# Patient Record
Sex: FEMALE | Race: WHITE | NOT HISPANIC OR LATINO | Employment: FULL TIME | URBAN - METROPOLITAN AREA
[De-identification: names, ages, dates, MRNs, and addresses within clinical notes are randomized per-mention and may not be internally consistent; named-entity substitution may affect disease eponyms.]

---

## 2017-01-02 ENCOUNTER — TELEPHONE (OUTPATIENT)
Dept: URGENT CARE | Facility: URGENT CARE | Age: 42
End: 2017-01-02

## 2017-01-02 DIAGNOSIS — M54.9 BACK PAIN, UNSPECIFIED BACK LOCATION, UNSPECIFIED BACK PAIN LATERALITY, UNSPECIFIED CHRONICITY: Primary | ICD-10-CM

## 2017-01-02 RX ORDER — HYDROCODONE BITARTRATE AND ACETAMINOPHEN 5; 325 MG/1; MG/1
1 TABLET ORAL EVERY 6 HOURS PRN
Qty: 12 TABLET | Refills: 0 | Status: SHIPPED | OUTPATIENT
Start: 2017-01-02 | End: 2017-01-05

## 2017-04-13 ENCOUNTER — HOSPITAL ENCOUNTER (OUTPATIENT)
Dept: MAMMOGRAPHY | Facility: CLINIC | Age: 42
Discharge: HOME | End: 2017-04-13
Attending: OBSTETRICS & GYNECOLOGY | Admitting: OBSTETRICS & GYNECOLOGY
Payer: MEDICAID

## 2017-04-13 DIAGNOSIS — Z12.31 VISIT FOR SCREENING MAMMOGRAM: ICD-10-CM

## 2017-04-13 PROCEDURE — G0202 SCR MAMMO BI INCL CAD: HCPCS

## 2017-08-10 ENCOUNTER — OFFICE VISIT (OUTPATIENT)
Dept: URGENT CARE | Facility: URGENT CARE | Age: 42
End: 2017-08-10
Payer: COMMERCIAL

## 2017-08-10 VITALS
SYSTOLIC BLOOD PRESSURE: 120 MMHG | WEIGHT: 194.5 LBS | DIASTOLIC BLOOD PRESSURE: 70 MMHG | OXYGEN SATURATION: 98 % | TEMPERATURE: 98 F | BODY MASS INDEX: 30.01 KG/M2 | HEART RATE: 88 BPM

## 2017-08-10 DIAGNOSIS — M54.41 ACUTE BILATERAL LOW BACK PAIN WITH RIGHT-SIDED SCIATICA: Primary | ICD-10-CM

## 2017-08-10 PROCEDURE — 99214 OFFICE O/P EST MOD 30 MIN: CPT | Performed by: PHYSICIAN ASSISTANT

## 2017-08-10 RX ORDER — ACETAMINOPHEN AND CODEINE PHOSPHATE 300; 30 MG/1; MG/1
1-2 TABLET ORAL EVERY 4 HOURS PRN
Qty: 15 TABLET | Refills: 0 | Status: SHIPPED | OUTPATIENT
Start: 2017-08-10 | End: 2017-10-01

## 2017-08-10 RX ORDER — PREDNISONE 20 MG/1
TABLET ORAL
Qty: 18 TABLET | Refills: 0 | Status: SHIPPED | OUTPATIENT
Start: 2017-08-10 | End: 2017-10-01

## 2017-08-10 RX ORDER — CYCLOBENZAPRINE HCL 5 MG
5 TABLET ORAL 3 TIMES DAILY PRN
Qty: 30 TABLET | Refills: 0 | Status: SHIPPED | OUTPATIENT
Start: 2017-08-10 | End: 2018-10-17

## 2017-08-10 NOTE — PROGRESS NOTES
May is a 42 year old female who presents with bilateral low back pain since this morning, radiates down right leg to her mid calf.    States that she cleaned her carpet yesterday, felt fine afterwards, but pain started in back upon awakening today.  She states that this has happened about 10 times, maybe annually when she cleans her carpets.        There is no history of injury.    The pain is located on both sides.  The back pain radiates into the right leg.  Measures which improve the pain include nothing.    Had to leave work early today    Measures which worsen the pain include movement and sitting.      There is no history of abdominal pain or change in urination.    Past Medical History:   Diagnosis Date     Allergic rhinitis, cause unspecified      Back pain     last 16 years     Bipolar I disorder, most recent episode (or current) unspecified      Diabetes in preg-unspec      Hyperlipidemia LDL goal <160 10/31/2010     Hysteroscopic sterilization by ESSURE--3/30/10 3/30/2010     Lumbago 2/1/2010     Ovarian cyst 2/3/2009     Ovarian cyst      Pain in joint, pelvic region and thigh 2/1/2010     Tobacco use disorder        Social History   Substance Use Topics     Smoking status: Current Every Day Smoker     Packs/day: 0.50     Years: 15.00     Types: Cigarettes     Smokeless tobacco: Never Used      Comment: 1/2 PPD     Alcohol use Yes      Comment: 10 per month       EXAM: The patient today appears uncomfortable.  /70 (BP Location: Left arm, Patient Position: Chair, Cuff Size: Adult Regular)  Pulse 88  Temp 98  F (36.7  C) (Oral)  Wt 194 lb 8 oz (88.2 kg)  SpO2 98%  BMI 30.01 kg/m2  Back symmetric, no curvature. ROM normal. No CVA tenderness., positive findings: tenderness to palpation in paraspinous muscles bilaterally.  Positive straight leg test on right  EXT: Lower extremities have excellent strength bilaterally  NEURO:  Sensation to light touch intact bilaterally  DTRs normal and  symmetric throughout    (M54.41) Acute bilateral low back pain with right-sided sciatica  (primary encounter diagnosis)  Comment:   Plan: predniSONE (DELTASONE) 20 MG tablet,         cyclobenzaprine (FLEXERIL) 5 MG tablet,         acetaminophen-codeine (TYLENOL/CODEINE #3)         300-30 MG per tablet          May try ice to area for 20 minutes 3 times a day (over thin cloth)  Advise follow up with Orthopedics    Suggest avoiding carpet cleaning in future as it seems to consistently aggravate your back    Patient expresses understanding and agreement with the assessment and plan as above.

## 2017-08-10 NOTE — MR AVS SNAPSHOT
"              After Visit Summary   8/10/2017    May Burleson    MRN: [id number]           Patient Information     Date Of Birth          1975        Visit Information        Provider Department      8/10/2017 2:05 PM Bianca Mendiola PA-C [facility]        Today's Diagnoses     Acute bilateral low back pain with right-sided sciatica    -  1      Care Instructions    (M54.41) Acute bilateral low back pain with right-sided sciatica  (primary encounter diagnosis)  Comment:   Plan: predniSONE (DELTASONE) 20 MG tablet,         cyclobenzaprine (FLEXERIL) 5 MG tablet,         acetaminophen-codeine (TYLENOL/CODEINE #3)         300-30 MG per tablet          May try ice to area for 20 minutes 3 times a day (over thin cloth)  Advise follow up with Orthopedics    Suggest avoiding carpet cleaning in future as it seems to consistently aggravate your back          Follow-ups after your visit        Who to contact     If you have questions or need follow up information about today's clinic visit or your schedule please contact [facility] directly at [phone].  Normal or non-critical lab and imaging results will be communicated to you by Anturishart, letter or phone within 4 business days after the clinic has received the results. If you do not hear from us within 7 days, please contact the clinic through Samplesaintt or phone. If you have a critical or abnormal lab result, we will notify you by phone as soon as possible.  Submit refill requests through Zubie or call your pharmacy and they will forward the refill request to us. Please allow 3 business days for your refill to be completed.          Additional Information About Your Visit        Anturishart Information     Zubie lets you send messages to your doctor, view your test results, renew your prescriptions, schedule appointments and more. To sign up, go to [email or web address] . Click on \"Log in\" " "on the left side of the screen, which will take you to the Welcome page. Then click on \"Sign up Now\" on the right side of the page.     You will be asked to enter the access code listed below, as well as some personal information. Please follow the directions to create your username and password.     Your access code is: [email or web address]  Expires: 2017  2:59 PM     Your access code will  in 90 days. If you need help or a new code, please call your [facility] or [phone].        Care EveryWhere ID     This is your Care EveryWhere ID. This could be used by other organizations to access your [facility] medical records  [id number]        Your Vitals Were     Pulse Temperature Pulse Oximetry BMI (Body Mass Index)          88 98  F (36.7  C) (Oral) 98% 30.01 kg/m2         Blood Pressure from Last 3 Encounters:   08/10/17 120/70   16 130/83   10/05/16 126/78    Weight from Last 3 Encounters:   08/10/17 194 lb 8 oz (88.2 kg)   16 191 lb (86.6 kg)   10/05/16 192 lb 14.4 oz (87.5 kg)              Today, you had the following     No orders found for display         Today's Medication Changes          These changes are accurate as of: 8/10/17  2:59 PM.  If you have any questions, ask your nurse or doctor.               Start taking these medicines.        Dose/Directions    acetaminophen-codeine 300-30 MG per tablet   Commonly known as:  TYLENOL/codeine #3   Used for:  Acute bilateral low back pain with right-sided sciatica   Started by:  Bianca Mendiola PA-C        Dose:  1-2 tablet   Take 1-2 tablets by mouth every 4 hours as needed for mild pain   Quantity:  15 tablet   Refills:  0       cyclobenzaprine 5 MG tablet   Commonly known as:  FLEXERIL   Used for:  Acute bilateral low back pain with right-sided sciatica   Started by:  Bianac Mendiola PA-C        Dose:  5 mg   Take 1 tablet (5 mg) by mouth 3 times daily as needed   Quantity:  30 tablet   Refills:  0         These " medicines have changed or have updated prescriptions.        Dose/Directions    * predniSONE 20 MG tablet   Commonly known as:  DELTASONE   This may have changed:  Another medication with the same name was added. Make sure you understand how and when to take each.   Used for:  Cervical radiculopathy   Changed by:  Augustin Pan MD        Take 3 tabs (60 mg) orally daily for 3 days, 2 tabs (40 mg) orally daily for 3 days, 1 tab (20 mg) orally daily for 3 days, then 1/2 tab (10 mg) orally for 3 days   Quantity:  20 tablet   Refills:  0       * predniSONE 20 MG tablet   Commonly known as:  DELTASONE   This may have changed:  You were already taking a medication with the same name, and this prescription was added. Make sure you understand how and when to take each.   Used for:  Acute bilateral low back pain with right-sided sciatica   Changed by:  Bianca Mendiola PA-C        3 po QD for 3 days, then 2 po QD for 3 days, then 1 po QD for 3 days   Quantity:  18 tablet   Refills:  0       * Notice:  This list has 2 medication(s) that are the same as other medications prescribed for you. Read the directions carefully, and ask your doctor or other care provider to review them with you.         Where to get your medicines      These medications were sent to [facility] [address] [address]     Phone:  [phone]     cyclobenzaprine 5 MG tablet    predniSONE 20 MG tablet         Some of these will need a paper prescription and others can be bought over the counter.  Ask your nurse if you have questions.     Bring a paper prescription for each of these medications     acetaminophen-codeine 300-30 MG per tablet                Primary Care Provider Office Phone # Fax #    Spike Pederson [phone]       [address] [address]        Equal Access to Services     CRYSTAL SALGADO AH: Patricio Wright  waangelinada chenadaha, qaybta kaalleila peterson, nita mccartneyaan ah. So [facility] [phone].    ATENCIÓN: Si joshua lucia, tiene a jones disposición servicios gratuitos de asistencia lingüística. Adri al [phone].    We comply with applicable federal civil rights laws and [place] laws. We do not discriminate on the basis of race, color, national origin, age, disability sex, sexual orientation or gender identity.            Thank you!     Thank you for choosing [facility]  for your care. Our goal is always to provide you with excellent care. Hearing back from our patients is one way we can continue to improve our services. Please take a few minutes to complete the written survey that you may receive in the mail after your visit with us. Thank you!             Your Updated Medication List - Protect others around you: Learn how to safely use, store and throw away your medicines at www.disposemymeds.org.          This list is accurate as of: 8/10/17  2:59 PM.  Always use your most recent med list.                   Brand Name Dispense Instructions for use Diagnosis    acetaminophen-codeine 300-30 MG per tablet    TYLENOL/codeine #3    15 tablet    Take 1-2 tablets by mouth every 4 hours as needed for mild pain    Acute bilateral low back pain with right-sided sciatica       cyclobenzaprine 5 MG tablet    FLEXERIL    30 tablet    Take 1 tablet (5 mg) by mouth 3 times daily as needed    Acute bilateral low back pain with right-sided sciatica       naproxen 500 MG tablet    NAPROSYN    60 tablet    Take 1 tablet (500 mg) by mouth 2 times daily (with meals)    Right foot pain       order for DME     1 Device    Equipment being ordered: rigid post op shoe    Right foot pain       * predniSONE 20 MG tablet    DELTASONE    20 tablet    Take 3 tabs (60 mg) orally daily for 3 days, 2 tabs (40 mg) orally daily for 3 days, 1 tab (20 mg) orally daily for 3 days, then 1/2 tab (10 mg)  orally for 3 days    Cervical radiculopathy       * predniSONE 20 MG tablet    DELTASONE    18 tablet    3 po QD for 3 days, then 2 po QD for 3 days, then 1 po QD for 3 days    Acute bilateral low back pain with right-sided sciatica       traMADol 50 MG tablet    ULTRAM    20 tablet    Take 1-2 tablets ( mg) by mouth every 6 hours as needed for pain maximum  tablet(s) per day    Cervical radiculopathy       valACYclovir 1000 mg tablet    VALTREX    21 tablet    Take 1 tablet (1,000 mg) by mouth 3 times daily for 7 days    Herpes zoster without complication       venlafaxine 75 MG 24 hr capsule    EFFEXOR-XR    90 capsule    Take 1 capsule (75 mg) by mouth daily    Anxiety       * Notice:  This list has 2 medication(s) that are the same as other medications prescribed for you. Read the directions carefully, and ask your doctor or other care provider to review them with you.

## 2017-08-10 NOTE — PATIENT INSTRUCTIONS
(M54.41) Acute bilateral low back pain with right-sided sciatica  (primary encounter diagnosis)  Comment:   Plan: predniSONE (DELTASONE) 20 MG tablet,         cyclobenzaprine (FLEXERIL) 5 MG tablet,         acetaminophen-codeine (TYLENOL/CODEINE #3)         300-30 MG per tablet          May try ice to area for 20 minutes 3 times a day (over thin cloth)  Advise follow up with Orthopedics    Suggest avoiding carpet cleaning in future as it seems to consistently aggravate your back

## 2017-08-10 NOTE — NURSING NOTE
"Chief Complaint   Patient presents with     Back Pain     Lower back pain which radiated to leg x today        Initial /70 (BP Location: Left arm, Patient Position: Chair, Cuff Size: Adult Regular)  Pulse 88  Temp 98  F (36.7  C) (Oral)  Wt 194 lb 8 oz (88.2 kg)  SpO2 98%  BMI 30.01 kg/m2 Estimated body mass index is 30.01 kg/(m^2) as calculated from the following:    Height as of 8/19/16: 5' 7.5\" (1.715 m).    Weight as of this encounter: 194 lb 8 oz (88.2 kg).  Medication Reconciliation: complete    "

## 2017-10-01 ENCOUNTER — OFFICE VISIT (OUTPATIENT)
Dept: URGENT CARE | Facility: URGENT CARE | Age: 42
End: 2017-10-01
Payer: COMMERCIAL

## 2017-10-01 VITALS
BODY MASS INDEX: 29.78 KG/M2 | SYSTOLIC BLOOD PRESSURE: 100 MMHG | HEART RATE: 83 BPM | OXYGEN SATURATION: 96 % | WEIGHT: 193 LBS | DIASTOLIC BLOOD PRESSURE: 70 MMHG | TEMPERATURE: 98.6 F

## 2017-10-01 DIAGNOSIS — J20.9 ACUTE BRONCHITIS WITH SYMPTOMS > 10 DAYS: Primary | ICD-10-CM

## 2017-10-01 PROCEDURE — 99213 OFFICE O/P EST LOW 20 MIN: CPT | Performed by: FAMILY MEDICINE

## 2017-10-01 RX ORDER — ALBUTEROL SULFATE 90 UG/1
1-2 AEROSOL, METERED RESPIRATORY (INHALATION) EVERY 4 HOURS PRN
Qty: 1 INHALER | Refills: 0 | Status: SHIPPED | OUTPATIENT
Start: 2017-10-01 | End: 2018-10-22

## 2017-10-01 RX ORDER — AZITHROMYCIN 250 MG/1
TABLET, FILM COATED ORAL
Qty: 6 TABLET | Refills: 0 | Status: SHIPPED | OUTPATIENT
Start: 2017-10-01 | End: 2018-10-17

## 2017-10-01 NOTE — PATIENT INSTRUCTIONS

## 2017-10-01 NOTE — PROGRESS NOTES
SUBJECTIVE:  Chief Complaint   Patient presents with     URI     Pt c/o URI sxs X 3 weeks. Today, pt has a fever of 101 R/O pneumonia.     Urgent Care     May Burleson is a 42 year old female who presents to the clinic today with a chief complaint of cough  and shortness of breath. for 3 week(s).  Patient denies central chest pain.   Her cough is described as persistent, daytime, nightime, productive of yellow sputum and wheezing.    The patient's symptoms are moderate and worsening.  Associated symptoms include fever, nasal congestion and malaise. The patient's symptoms are exacerbated by exercise  Patient has been using OTC cough suppressants  to improve symptoms.    Past Medical History:   Diagnosis Date     Allergic rhinitis, cause unspecified      Back pain     last 16 years     Bipolar I disorder, most recent episode (or current) unspecified      Diabetes mellitus of mother, complicating pregnancy, childbirth, or the puerperium, unspecified as to episode of care(648.00)      Hyperlipidemia LDL goal <160 10/31/2010     Hysteroscopic sterilization by ESS[facility]--3/30/10 3/30/2010     Lumbago 2/1/2010     Ovarian cyst 2/3/2009     Ovarian cyst      Pain in joint, pelvic region and thigh 2/1/2010     Tobacco use disorder        ALLERGIES:  Albuterol; Penicillins; and Pollen extract      Current Outpatient Prescriptions on File Prior to Visit:  cyclobenzaprine (FLEXERIL) 5 MG tablet Take 1 tablet (5 mg) by mouth 3 times daily as needed     No current facility-administered medications on file prior to visit.     Social History   Substance Use Topics     Smoking status: Current Every Day Smoker     Packs/day: 0.50     Years: 15.00     Types: Cigarettes     Smokeless tobacco: Never Used      Comment: 1/2 PPD     Alcohol use Yes      Comment: 10 per month       Family History   Problem Relation Age of Onset     DIABETES Paternal Grandmother      Depression Mother      DIABETES Mother      Hypertension Mother       DIABETES Paternal Grandfather      CANCER Maternal Grandmother      colon     Thyroid Disease Daughter      asthma     Allergies Daughter      Allergies Son      C.A.D. Maternal Grandfather      MI in his 50s     C.A.D. Paternal Grandfather      mi in his 50s     Breast Cancer Paternal Grandmother          ROS  INTEGUMENTARY/SKIN: NEGATIVE for worrisome rashes, moles or lesions  EYES: NEGATIVE for vision changes or irritation  GI: NEGATIVE for nausea, abdominal pain, heartburn, or change in bowel habits    OBJECTIVE:  /70  Pulse 83  Temp 98.6  F (37  C)  Wt 193 lb (87.5 kg)  SpO2 96%  BMI 29.78 kg/m2  GENERAL APPEARANCE: alert, mild distress and cooperative  EYES: EOMI,  PERRL, conjunctiva clear  HENT: ear canals and TM's normal.  Nose and mouth without ulcers, erythema or lesions  NECK: supple, nontender, no lymphadenopathy  RESP: rhonchi bilateral, little  CV: regular rates and rhythm, normal S1 S2, no murmur noted  ABDOMEN:  soft, nontender, no HSM or masses and bowel sounds normal  NEURO: Normal strength and tone, sensory exam grossly normal,  normal speech and mentation  SKIN: no suspicious lesions or rashes       ASSESSMENT:    Acute bronchitis with symptoms > 10 days     - azithromycin (ZITHROMAX) 250 MG tablet; 2 tablets day 1 then 1 tablet daily for 4 days  - albuterol (PROAIR HFA/PROVENTIL HFA/VENTOLIN HFA) 108 (90 BASE) MCG/ACT Inhaler; Inhale 1-2 puffs into the lungs every 4 hours as needed for shortness of breath / dyspnea or wheezing       Symptomatic measures encouraged, humidified air, plenty of fluids.  Patient may consider OTC expectorant and/or cough suppressant to treat symptoms.  Return if worsening

## 2017-10-01 NOTE — NURSING NOTE
"Chief Complaint   Patient presents with     URI     Pt c/o URI sxs X 3 weeks. Today, pt has a fever of 101 R/O pneumonia.     Urgent Care       Initial /70  Pulse 83  Temp 98.6  F (37  C)  Wt 193 lb (87.5 kg)  SpO2 96%  BMI 29.78 kg/m2 Estimated body mass index is 29.78 kg/(m^2) as calculated from the following:    Height as of 8/19/16: 5' 7.5\" (1.715 m).    Weight as of this encounter: 193 lb (87.5 kg).  Medication Reconciliation: complete    "

## 2018-02-09 NOTE — MR AVS SNAPSHOT
After Visit Summary   10/1/2017    May Burleson    MRN: [id number]           Patient Information     Date Of Birth          1975        Visit Information        Provider Department      10/1/2017 12:20 PM Reena Victor MD [facility]        Today's Diagnoses     Acute bronchitis with symptoms > 10 days    -  1      Care Instructions      Acute Bronchitis  Your healthcare provider has told you that you have acute bronchitis. Bronchitis is infection or inflammation of the bronchial tubes (airways in the lungs). Normally, air moves easily in and out of the airways. Bronchitis narrows the airways, making it harder for air to flow in and out of the lungs. This causes symptoms such as shortness of breath, coughing up yellow or green mucus, and wheezing. Bronchitis can be acute or chronic. Acute means the condition comes on quickly and goes away in a short time, usually within 3 to 10 days. Chronic means a condition lasts a long time and often comes back.    What causes acute bronchitis?  Acute bronchitis almost always starts as a viral respiratory infection, such as a cold or the flu. Certain factors make it more likely for a cold or flu to turn into bronchitis. These include being very young, being elderly, having a heart or lung problem, or having a weak immune system. Cigarette smoking also makes bronchitis more likely.  When bronchitis develops, the airways become swollen. The airways may also become infected with bacteria. This is known as a secondary infection.  Diagnosing acute bronchitis  Your healthcare provider will examine you and ask about your symptoms and health history. You may also have a sputum culture to test the fluid in your lungs. Chest X-rays may be done to look for infection in the lungs.  Treating acute bronchitis  Bronchitis usually clears up as the cold or flu goes away. You can help feel better faster by doing the following:    Take  medicine as directed. You may be told to take ibuprofen or other over-the-counter medicines. These help relieve inflammation in your bronchial tubes. Your healthcare provider may prescribe an inhaler to help open up the bronchial tubes. Most of the time, acute bronchitis is caused by a viral infection. Antibiotics are usually not prescribed for viral infections.    Drink plenty of fluids, such as water, juice, or warm soup. Fluids loosen mucus so that you can cough it up. This helps you breathe more easily. Fluids also prevent dehydration.    Make sure you get plenty of rest.    Do not smoke. Do not allow anyone else to smoke in your home.  Recovery and follow-up  Follow up with your doctor as you are told. You will likely feel better in a week or two. But a dry cough can linger beyond that time. Let your doctor know if you still have symptoms (other than a dry cough) after 2 weeks, or if you re prone to getting bronchial infections. Take steps to protect yourself from future infections. These steps include stopping smoking and avoiding tobacco smoke, washing your hands often, and getting a yearly flu shot.  When to call your healthcare provider  Call the healthcare provider if you have any of the following:    Fever of 100.4 F (38.0 C) or higher, or as advised    Symptoms that get worse, or new symptoms    Trouble breathing    Symptoms that don t start to improve within a week, or within 3 days of taking antibiotics   Date Last Reviewed: 12/1/2016 2000-2017 [employer]. [address] [place] [address], [place] [address]. All rights reserved. This information is not intended as a substitute for professional medical care. Always follow your healthcare professional's instructions.                Follow-ups after your visit        Who to contact     If you have questions or need follow up information about today's clinic visit or your schedule please contact [facility] directly at  "[phone].  Normal or non-critical lab and imaging results will be communicated to you by MyChart, letter or phone within 4 business days after the clinic has received the results. If you do not hear from us within 7 days, please contact the clinic through Pannahart or phone. If you have a critical or abnormal lab result, we will notify you by phone as soon as possible.  Submit refill requests through stylefruits or call your pharmacy and they will forward the refill request to us. Please allow 3 business days for your refill to be completed.          Additional Information About Your Visit        Pannahart Information     stylefruits lets you send messages to your doctor, view your test results, renew your prescriptions, schedule appointments and more. To sign up, go to [email or web address] . Click on \"Log in\" on the left side of the screen, which will take you to the Welcome page. Then click on \"Sign up Now\" on the right side of the page.     You will be asked to enter the access code listed below, as well as some personal information. Please follow the directions to create your username and password.     Your access code is: [email or web address]  Expires: 2017  2:59 PM     Your access code will  in 90 days. If you need help or a new code, please call your [place] clinic or [phone].        Care EveryWhere ID     This is your Care EveryWhere ID. This could be used by other organizations to access your [place] medical records  [id number]        Your Vitals Were     Pulse Temperature Pulse Oximetry BMI (Body Mass Index)          83 98.6  F (37  C) 96% 29.78 kg/m2         Blood Pressure from Last 3 Encounters:   10/01/17 100/70   08/10/17 120/70   16 130/83    Weight from Last 3 Encounters:   10/01/17 193 lb (87.5 kg)   08/10/17 194 lb 8 oz (88.2 kg)   16 191 lb (86.6 kg)              Today, you had the following     No orders found for display         Today's Medication Changes          These " changes are accurate as of: 10/1/17  1:34 PM.  If you have any questions, ask your nurse or doctor.               Start taking these medicines.        Dose/Directions    albuterol 108 (90 BASE) MCG/ACT Inhaler   Commonly known as:  PROAIR HFA/PROVENTIL HFA/VENTOLIN HFA   Used for:  Acute bronchitis with symptoms > 10 days   Started by:  Reena Victor MD        Dose:  1-2 puff   Inhale 1-2 puffs into the lungs every 4 hours as needed for shortness of breath / dyspnea or wheezing   Quantity:  1 Inhaler   Refills:  0       azithromycin 250 MG tablet   Commonly known as:  ZITHROMAX   Used for:  Acute bronchitis with symptoms > 10 days   Started by:  Reena Victor MD        2 tablets day 1 then 1 tablet daily for 4 days   Quantity:  6 tablet   Refills:  0            Where to get your medicines      These medications were sent to [facility] [address] [place] [address] [address] AT [place] [address] [address] [facility] [address]    Hours:  24-hours Phone:  [phone]     albuterol 108 (90 BASE) MCG/ACT Inhaler    azithromycin 250 MG tablet                Primary Care Provider Office Phone # Fax #    Spike Pederson [phone]       [address] [address]        Equal Access to Services     CRYSTAL SALGADO AH: Hadii aad ku hadasho Soomaali, waaxda luqadaha, qaybta kaalmada adejermaineyada, nita alves . So [facility] [phone].    ATENCIÓN: Si habla español, tiene a jones disposición servicios gratuitos de asistencia lingüística. Adri al [phone].    We comply with applicable federal civil rights laws and [place] laws. We do not discriminate on the basis of race, color, national origin, age, disability, sex, sexual orientation, or gender identity.            Thank you!     Thank you for choosing [facility]  for your care. Our goal is always to provide you with excellent care. Hearing back from  our patients is one way we can continue to improve our services. Please take a few minutes to complete the written survey that you may receive in the mail after your visit with us. Thank you!             Your Updated Medication List - Protect others around you: Learn how to safely use, store and throw away your medicines at www.disposemymeds.org.          This list is accurate as of: 10/1/17  1:34 PM.  Always use your most recent med list.                   Brand Name Dispense Instructions for use Diagnosis    albuterol 108 (90 BASE) MCG/ACT Inhaler    PROAIR HFA/PROVENTIL HFA/VENTOLIN HFA    1 Inhaler    Inhale 1-2 puffs into the lungs every 4 hours as needed for shortness of breath / dyspnea or wheezing    Acute bronchitis with symptoms > 10 days       azithromycin 250 MG tablet    ZITHROMAX    6 tablet    2 tablets day 1 then 1 tablet daily for 4 days    Acute bronchitis with symptoms > 10 days       cyclobenzaprine 5 MG tablet    FLEXERIL    30 tablet    Take 1 tablet (5 mg) by mouth 3 times daily as needed    Acute bilateral low back pain with right-sided sciatica          4

## 2018-05-14 ENCOUNTER — OFFICE VISIT (OUTPATIENT)
Dept: INTERNAL MEDICINE | Facility: CLINIC | Age: 43
End: 2018-05-14
Payer: COMMERCIAL

## 2018-05-14 VITALS
HEART RATE: 80 BPM | RESPIRATION RATE: 16 BRPM | HEIGHT: 68 IN | SYSTOLIC BLOOD PRESSURE: 110 MMHG | TEMPERATURE: 98.3 F | DIASTOLIC BLOOD PRESSURE: 80 MMHG | WEIGHT: 187.3 LBS | BODY MASS INDEX: 28.39 KG/M2

## 2018-05-14 DIAGNOSIS — J31.0 CHRONIC RHINITIS, UNSPECIFIED TYPE: ICD-10-CM

## 2018-05-14 DIAGNOSIS — B96.89 BACTERIAL SINUSITIS: Primary | ICD-10-CM

## 2018-05-14 DIAGNOSIS — J32.9 BACTERIAL SINUSITIS: Primary | ICD-10-CM

## 2018-05-14 PROCEDURE — 99213 OFFICE O/P EST LOW 20 MIN: CPT | Performed by: PHYSICIAN ASSISTANT

## 2018-05-14 RX ORDER — DOXYCYCLINE 100 MG/1
100 CAPSULE ORAL 2 TIMES DAILY
Qty: 14 CAPSULE | Refills: 0 | Status: SHIPPED | OUTPATIENT
Start: 2018-05-14 | End: 2018-10-17

## 2018-05-14 RX ORDER — CETIRIZINE HYDROCHLORIDE 10 MG/1
10 TABLET ORAL EVERY EVENING
Qty: 30 TABLET | Refills: 11 | Status: SHIPPED | OUTPATIENT
Start: 2018-05-14 | End: 2019-06-14

## 2018-05-14 RX ORDER — FLUTICASONE PROPIONATE 50 MCG
1-2 SPRAY, SUSPENSION (ML) NASAL DAILY
Qty: 1 BOTTLE | Refills: 11 | Status: SHIPPED | OUTPATIENT
Start: 2018-05-14 | End: 2020-09-18

## 2018-05-14 ASSESSMENT — PAIN SCALES - GENERAL: PAINLEVEL: MODERATE PAIN (4)

## 2018-05-14 NOTE — MR AVS SNAPSHOT
"              After Visit Summary   2018    May Arrieta    MRN: [id number]           Patient Information     Date Of Birth          1975        Visit Information        Provider Department      2018 2:40 PM Minda Millan PA-C [facility]        Today's Diagnoses     Bacterial sinusitis    -  1    Chronic rhinitis, unspecified type          Care Instructions    Follow up if symptoms worsen or fail to improve           Follow-ups after your visit        Who to contact     If you have questions or need follow up information about today's clinic visit or your schedule please contact [facility] directly at [phone].  Normal or non-critical lab and imaging results will be communicated to you by MyChart, letter or phone within 4 business days after the clinic has received the results. If you do not hear from us within 7 days, please contact the clinic through MyChart or phone. If you have a critical or abnormal lab result, we will notify you by phone as soon as possible.  Submit refill requests through Exakis or call your pharmacy and they will forward the refill request to us. Please allow 3 business days for your refill to be completed.          Additional Information About Your Visit        MyChart Information     Exakis lets you send messages to your doctor, view your test results, renew your prescriptions, schedule appointments and more. To sign up, go to [email or web address] . Click on \"Log in\" on the left side of the screen, which will take you to the Welcome page. Then click on \"Sign up Now\" on the right side of the page.     You will be asked to enter the access code listed below, as well as some personal information. Please follow the directions to create your username and password.     Your access code is: [email or web address]  Expires: 2018  3:12 PM     Your access code will  in 90 days. If you need help or a new code, " "please call your [place] clinic or [phone].        Care EveryWhere ID     This is your Care EveryWhere ID. This could be used by other organizations to access your [place] medical records  [id number]        Your Vitals Were     Pulse Temperature Respirations Height BMI (Body Mass Index)       80 98.3  F (36.8  C) (Oral) 16 5' 7.5\" (1.715 m) 28.9 kg/m2        Blood Pressure from Last 3 Encounters:   05/14/18 110/80   10/01/17 100/70   08/10/17 120/70    Weight from Last 3 Encounters:   05/14/18 187 lb 4.8 oz (85 kg)   10/01/17 193 lb (87.5 kg)   08/10/17 194 lb 8 oz (88.2 kg)              Today, you had the following     No orders found for display         Today's Medication Changes          These changes are accurate as of 5/14/18  3:12 PM.  If you have any questions, ask your nurse or doctor.               Start taking these medicines.        Dose/Directions    cetirizine 10 MG tablet   Commonly known as:  zyrTEC   Used for:  Chronic rhinitis, unspecified type   Started by:  Minda Millan PA-C        Dose:  10 mg   Take 1 tablet (10 mg) by mouth every evening   Quantity:  30 tablet   Refills:  11       doxycycline 100 MG capsule   Commonly known as:  VIBRAMYCIN   Used for:  Bacterial sinusitis   Started by:  Minda Millan PA-C        Dose:  100 mg   Take 1 capsule (100 mg) by mouth 2 times daily   Quantity:  14 capsule   Refills:  0       fluticasone 50 MCG/ACT spray   Commonly known as:  FLONASE   Used for:  Chronic rhinitis, unspecified type   Started by:  Minda Millan PA-C        Dose:  1-2 spray   Spray 1-2 sprays into both nostrils daily   Quantity:  1 Bottle   Refills:  11            Where to get your medicines      These medications were sent to [facility] [address] [address]     Phone:  [phone]     cetirizine 10 MG tablet    doxycycline 100 MG capsule    fluticasone 50 MCG/ACT spray                Primary " Care Provider Office Phone # Fax #    Spike Pederson [phone]       [address] [facility] [address]        Equal Access to Services     CRYSTAL SALGADO : Hadii hillary ku williamso Sokaitlynali, waaxda luqadaha, qaybta kaalmada adejessi, nita nolankimberly papo. So [facility] [phone].    ATENCIÓN: Si habla español, tiene a jones disposición servicios gratuitos de asistencia lingüística. Llame al [phone].    We comply with applicable federal civil rights laws and [place] laws. We do not discriminate on the basis of race, color, national origin, age, disability, sex, sexual orientation, or gender identity.            Thank you!     Thank you for choosing [facility]  for your care. Our goal is always to provide you with excellent care. Hearing back from our patients is one way we can continue to improve our services. Please take a few minutes to complete the written survey that you may receive in the mail after your visit with us. Thank you!             Your Updated Medication List - Protect others around you: Learn how to safely use, store and throw away your medicines at www.disposemymeds.org.          This list is accurate as of 5/14/18  3:12 PM.  Always use your most recent med list.                   Brand Name Dispense Instructions for use Diagnosis    albuterol 108 (90 Base) MCG/ACT Inhaler    PROAIR HFA/PROVENTIL HFA/VENTOLIN HFA    1 Inhaler    Inhale 1-2 puffs into the lungs every 4 hours as needed for shortness of breath / dyspnea or wheezing    Acute bronchitis with symptoms > 10 days       azithromycin 250 MG tablet    ZITHROMAX    6 tablet    2 tablets day 1 then 1 tablet daily for 4 days    Acute bronchitis with symptoms > 10 days       cetirizine 10 MG tablet    zyrTEC    30 tablet    Take 1 tablet (10 mg) by mouth every evening    Chronic rhinitis, unspecified type       cyclobenzaprine 5 MG tablet    FLEXERIL    30 tablet    Take 1 tablet  (5 mg) by mouth 3 times daily as needed    Acute bilateral low back pain with right-sided sciatica       doxycycline 100 MG capsule    VIBRAMYCIN    14 capsule    Take 1 capsule (100 mg) by mouth 2 times daily    Bacterial sinusitis       fluticasone 50 MCG/ACT spray    FLONASE    1 Bottle    Spray 1-2 sprays into both nostrils daily    Chronic rhinitis, unspecified type

## 2018-05-14 NOTE — PROGRESS NOTES
"  SUBJECTIVE:   May Arrieta is a 42 year old female who presents to clinic today for the following health issues:      Acute Illness   Acute illness concerns: Sinus Pressure/Pain   Onset: x2 wks     Fever: no     Chills/Sweats: no     Headache (location?): YES    Sinus Pressure:YES    Conjunctivitis:  YES: both watery     Ear Pain: no    Rhinorrhea: no     Congestion: YES    Sore Throat: YES- slightly in the morning feels itchy and puffy      Cough: YES, dry     Wheeze: no     Decreased Appetite: YES- slightly     Nausea: YES- slightly     Vomiting: no     Diarrhea:  no     Dysuria/Freq.: no     Fatigue/Achiness: YES- fatigue     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  Additional history: as documented      Reviewed and updated as needed this visit by clinical staff  Tobacco  Allergies  Meds  Problems       Reviewed and updated as needed this visit by Provider  Meds  Problems           OBJECTIVE:     /80 (BP Location: Right arm, Patient Position: Chair, Cuff Size: Adult Regular)  Pulse 80  Temp 98.3  F (36.8  C) (Oral)  Resp 16  Ht 5' 7.5\" (1.715 m)  Wt 187 lb 4.8 oz (85 kg)  BMI 28.9 kg/m2  Body mass index is 28.9 kg/(m^2).  GENERAL: healthy, alert and no distress  EYES: Eyes grossly normal to inspection, PERRL and conjunctivae and sclerae normal  HENT: normal cephalic/atraumatic, ear canals and TM's normal, nasal mucosa edematous , oropharynx clear, oral mucous membranes moist and sinuses: maxillary, frontal tenderness on bilaterally  NECK: bilateral anterior cervical adenopathy  RESP: lungs clear to auscultation - no rales, rhonchi or wheezes  CV: regular rates and rhythm, normal S1 S2, no S3 or S4 and no murmur, click or rub    Diagnostic Test Results:  none     ASSESSMENT/PLAN:         1. Bacterial sinusitis  - sinus pain > 10 " days with treat esumptively for bacterial infection  - doxycycline chosen based on penicillin allergy she   - doxycycline (VIBRAMYCIN) 100 MG capsule; Take 1 capsule (100 mg) by mouth 2 times daily  Dispense: 14 capsule; Refill: 0    2. Chronic rhinitis, unspecified type  - chronic underlying allergies, reviewed daily regimen as below   - cetirizine (ZYRTEC) 10 MG tablet; Take 1 tablet (10 mg) by mouth every evening  Dispense: 30 tablet; Refill: 11  - fluticasone (FLONASE) 50 MCG/ACT spray; Spray 1-2 sprays into both nostrils daily  Dispense: 1 Bottle; Refill: 11    Pt to follow-up if symptoms worsen or fail to improve.    Minda Millan PA-C  [facility]

## 2018-09-09 ENCOUNTER — OFFICE VISIT (OUTPATIENT)
Dept: URGENT CARE | Facility: URGENT CARE | Age: 43
End: 2018-09-09
Payer: COMMERCIAL

## 2018-09-09 VITALS
TEMPERATURE: 98.5 F | RESPIRATION RATE: 20 BRPM | DIASTOLIC BLOOD PRESSURE: 80 MMHG | WEIGHT: 187 LBS | SYSTOLIC BLOOD PRESSURE: 140 MMHG | HEART RATE: 80 BPM | BODY MASS INDEX: 28.86 KG/M2

## 2018-09-09 DIAGNOSIS — M54.41 ACUTE RIGHT-SIDED LOW BACK PAIN WITH RIGHT-SIDED SCIATICA: Primary | ICD-10-CM

## 2018-09-09 PROCEDURE — 99214 OFFICE O/P EST MOD 30 MIN: CPT | Performed by: FAMILY MEDICINE

## 2018-09-09 RX ORDER — METHYLPREDNISOLONE 4 MG
TABLET, DOSE PACK ORAL
Qty: 21 TABLET | Refills: 0 | Status: SHIPPED | OUTPATIENT
Start: 2018-09-09 | End: 2018-10-17

## 2018-09-09 RX ORDER — CYCLOBENZAPRINE HCL 5 MG
5 TABLET ORAL 2 TIMES DAILY PRN
Qty: 20 TABLET | Refills: 0 | Status: SHIPPED | OUTPATIENT
Start: 2018-09-09 | End: 2018-10-22

## 2018-09-09 NOTE — MR AVS SNAPSHOT
"              After Visit Summary   2018    May Arrieta    MRN: [id number]           Patient Information     Date Of Birth          1975        Visit Information        Provider Department      2018 11:05 AM Ashley Bernardo MD [facility]        Today's Diagnoses     Acute right-sided low back pain with right-sided sciatica    -  1       Follow-ups after your visit        Who to contact     If you have questions or need follow up information about today's clinic visit or your schedule please contact [facility] directly at [phone].  Normal or non-critical lab and imaging results will be communicated to you by TimePadhart, letter or phone within 4 business days after the clinic has received the results. If you do not hear from us within 7 days, please contact the clinic through TimePadhart or phone. If you have a critical or abnormal lab result, we will notify you by phone as soon as possible.  Submit refill requests through Purewire or call your pharmacy and they will forward the refill request to us. Please allow 3 business days for your refill to be completed.          Additional Information About Your Visit        MyChart Information     Purewire lets you send messages to your doctor, view your test results, renew your prescriptions, schedule appointments and more. To sign up, go to [email or web address] . Click on \"Log in\" on the left side of the screen, which will take you to the Welcome page. Then click on \"Sign up Now\" on the right side of the page.     You will be asked to enter the access code listed below, as well as some personal information. Please follow the directions to create your username and password.     Your access code is: [email or web address]  Expires: 2018 11:49 AM     Your access code will  in 90 days. If you need help or a new code, please call your [place] clinic or [phone].        Care EveryWhere ID     This " is your Care EveryWhere ID. This could be used by other organizations to access your [place] medical records  [id number]        Your Vitals Were     Pulse Temperature Respirations BMI (Body Mass Index)          80 98.5  F (36.9  C) (Oral) 20 28.86 kg/m2         Blood Pressure from Last 3 Encounters:   09/09/18 140/80   05/14/18 110/80   10/01/17 100/70    Weight from Last 3 Encounters:   09/09/18 187 lb (84.8 kg)   05/14/18 187 lb 4.8 oz (85 kg)   10/01/17 193 lb (87.5 kg)              Today, you had the following     No orders found for display         Today's Medication Changes          These changes are accurate as of 9/9/18 11:49 AM.  If you have any questions, ask your nurse or doctor.               Start taking these medicines.        Dose/Directions    acetaminophen-codeine 300-30 MG per tablet   Commonly known as:  TYLENOL #3   Used for:  Acute right-sided low back pain with right-sided sciatica   Started by:  Ashley Bernardo MD        Dose:  1 tablet   Take 1 tablet by mouth 2 times daily as needed for severe pain   Quantity:  6 tablet   Refills:  0       methylPREDNISolone 4 MG tablet   Commonly known as:  MEDROL DOSEPAK   Used for:  Acute right-sided low back pain with right-sided sciatica   Started by:  Ashley Bernardo MD        Follow package instructions   Quantity:  21 tablet   Refills:  0         These medicines have changed or have updated prescriptions.        Dose/Directions    * cyclobenzaprine 5 MG tablet   Commonly known as:  FLEXERIL   This may have changed:  Another medication with the same name was added. Make sure you understand how and when to take each.   Used for:  Acute bilateral low back pain with right-sided sciatica   Changed by:  Ashley Bernardo MD        Dose:  5 mg   Take 1 tablet (5 mg) by mouth 3 times daily as needed   Quantity:  30 tablet   Refills:  0       * cyclobenzaprine 5 MG tablet   Commonly known as:  FLEXERIL   This may have changed:  You were already taking a  medication with the same name, and this prescription was added. Make sure you understand how and when to take each.   Used for:  Acute right-sided low back pain with right-sided sciatica   Changed by:  Ashley Bernardo MD        Dose:  5 mg   Take 1 tablet (5 mg) by mouth 2 times daily as needed for muscle spasms   Quantity:  20 tablet   Refills:  0       * Notice:  This list has 2 medication(s) that are the same as other medications prescribed for you. Read the directions carefully, and ask your doctor or other care provider to review them with you.         Where to get your medicines      These medications were sent to [facility] [id number] [place] [address] [address] AT [facility] [address], [facility] [address]     Phone:  [phone]     cyclobenzaprine 5 MG tablet    methylPREDNISolone 4 MG tablet         Some of these will need a paper prescription and others can be bought over the counter.  Ask your nurse if you have questions.     Bring a paper prescription for each of these medications     acetaminophen-codeine 300-30 MG per tablet               Information about OPIOIDS     PRESCRIPTION OPIOIDS: WHAT YOU NEED TO KNOW   We gave you an opioid (narcotic) pain medicine. It is important to manage your pain, but opioids are not always the best choice. You should first try all the other options your care team gave you. Take this medicine for as short a time (and as few doses) as possible.    Some activities can increase your pain, such as bandage changes or therapy sessions. It may help to take your pain medicine 30 to 60 minutes before these activities. Reduce your stress by getting enough sleep, working on hobbies you enjoy and practicing relaxation or meditation. Talk to your care team about ways to manage your pain beyond prescription opioids.    These medicines have risks:    DO NOT drive when on new or higher doses of pain medicine. These medicines can affect your  alertness and reaction times, and you could be arrested for driving under the influence (DUI). If you need to use opioids long-term, talk to your care team about driving.    DO NOT operate heavy machinery    DO NOT do any other dangerous activities while taking these medicines.    DO NOT drink any alcohol while taking these medicines.     If the opioid prescribed includes acetaminophen, DO NOT take with any other medicines that contain acetaminophen. Read all labels carefully. Look for the word  acetaminophen  or  Tylenol.  Ask your pharmacist if you have questions or are unsure.    You can get addicted to pain medicines, especially if you have a history of addiction (chemical, alcohol or substance dependence). Talk to your care team about ways to reduce this risk.    All opioids tend to cause constipation. Drink plenty of water and eat foods that have a lot of fiber, such as fruits, vegetables, prune juice, apple juice and high-fiber cereal. Take a laxative (Miralax, milk of magnesia, Colace, Senna) if you don t move your bowels at least every other day. Other side effects include upset stomach, sleepiness, dizziness, throwing up, tolerance (needing more of the medicine to have the same effect), physical dependence and slowed breathing.    Store your pills in a secure place, locked if possible. We will not replace any lost or stolen medicine. If you don t finish your medicine, please throw away (dispose) as directed by your pharmacist. The [place] Pollution [employer] has more information about safe disposal: https://www.pca.[facility].[place].us/living-green/managing-unwanted-medications         Primary Care Provider Office Phone # Fax #    Spike Pederson [phone]       [address] [place] [address]        Equal Access to Services     CRYSTAL SALGADO AH: Patricio Wright, anne marie orellana, nita murphy. So [facility]  [phone].    ATENCIÓN: Si joshua lucia, tiene a jones disposición servicios gratuitos de asistencia lingüística. Adri sexton [phone].    We comply with applicable federal civil rights laws and [place] laws. We do not discriminate on the basis of race, color, national origin, age, disability, sex, sexual orientation, or gender identity.            Thank you!     Thank you for choosing [facility]  for your care. Our goal is always to provide you with excellent care. Hearing back from our patients is one way we can continue to improve our services. Please take a few minutes to complete the written survey that you may receive in the mail after your visit with us. Thank you!             Your Updated Medication List - Protect others around you: Learn how to safely use, store and throw away your medicines at www.disposemymeds.org.          This list is accurate as of 9/9/18 11:49 AM.  Always use your most recent med list.                   Brand Name Dispense Instructions for use Diagnosis    acetaminophen-codeine 300-30 MG per tablet    TYLENOL #3    6 tablet    Take 1 tablet by mouth 2 times daily as needed for severe pain    Acute right-sided low back pain with right-sided sciatica       albuterol 108 (90 Base) MCG/ACT inhaler    PROAIR HFA/PROVENTIL HFA/VENTOLIN HFA    1 Inhaler    Inhale 1-2 puffs into the lungs every 4 hours as needed for shortness of breath / dyspnea or wheezing    Acute bronchitis with symptoms > 10 days       azithromycin 250 MG tablet    ZITHROMAX    6 tablet    2 tablets day 1 then 1 tablet daily for 4 days    Acute bronchitis with symptoms > 10 days       cetirizine 10 MG tablet    zyrTEC    30 tablet    Take 1 tablet (10 mg) by mouth every evening    Chronic rhinitis, unspecified type       * cyclobenzaprine 5 MG tablet    FLEXERIL    30 tablet    Take 1 tablet (5 mg) by mouth 3 times daily as needed    Acute bilateral low back pain with right-sided sciatica        * cyclobenzaprine 5 MG tablet    FLEXERIL    20 tablet    Take 1 tablet (5 mg) by mouth 2 times daily as needed for muscle spasms    Acute right-sided low back pain with right-sided sciatica       doxycycline 100 MG capsule    VIBRAMYCIN    14 capsule    Take 1 capsule (100 mg) by mouth 2 times daily    Bacterial sinusitis       fluticasone 50 MCG/ACT spray    FLONASE    1 Bottle    Spray 1-2 sprays into both nostrils daily    Chronic rhinitis, unspecified type       methylPREDNISolone 4 MG tablet    MEDROL DOSEPAK    21 tablet    Follow package instructions    Acute right-sided low back pain with right-sided sciatica       * Notice:  This list has 2 medication(s) that are the same as other medications prescribed for you. Read the directions carefully, and ask your doctor or other care provider to review them with you.

## 2018-09-09 NOTE — PROGRESS NOTES
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  Pain is located in the low back right region, with radiation to radiates into the right buttocks,   Recent injury:none recalled by the patient  Personal hx of back pain is recurrent self limited episodes of low back pain in the past.  Pain is exacerbated by: standing, walking, lying, sitting, bending and changing position.  Pain is relieved by: rest[unfilled] sx include: none.  Red flag symptoms: negative.    Past Medical History:   Diagnosis Date     Allergic rhinitis, cause unspecified      Back pain     last 16 years     Bipolar I disorder, most recent episode (or current) unspecified      Diabetes mellitus of mother, complicating pregnancy, childbirth, or the puerperium, unspecified as to episode of care(648.00)      Hyperlipidemia LDL goal <160 10/31/2010     Hysteroscopic sterilization by [facility]--3/30/10 3/30/2010     Lumbago 2/1/2010     Ovarian cyst 2/3/2009     Ovarian cyst      Pain in joint, pelvic region and thigh 2/1/2010     Tobacco use disorder      Current Outpatient Prescriptions   Medication Sig Dispense Refill     acetaminophen-codeine (TYLENOL #3) 300-30 MG per tablet Take 1 tablet by mouth 2 times daily as needed for severe pain 6 tablet 0     cyclobenzaprine (FLEXERIL) 5 MG tablet Take 1 tablet (5 mg) by mouth 2 times daily as needed for muscle spasms 20 tablet 0     methylPREDNISolone (MEDROL DOSEPAK) 4 MG tablet Follow package instructions  21 tablet 0     albuterol (PROAIR HFA/PROVENTIL HFA/VENTOLIN HFA) 108 (90 BASE) MCG/ACT Inhaler Inhale 1-2 puffs into the lungs every 4 hours as needed for shortness of breath / dyspnea or wheezing (Patient not taking: Reported on 5/14/2018) 1 Inhaler 0     azithromycin (ZITHROMAX) 250 MG tablet 2 tablets day 1 then 1 tablet daily for 4 days (Patient not taking: Reported on 5/14/2018) 6 tablet 0     cetirizine (ZYRTEC) 10 MG tablet Take 1 tablet (10 mg) by mouth every evening (Patient not taking: Reported on 9/9/2018) 30 tablet 11     cyclobenzaprine (FLEXERIL) 5 MG tablet Take 1 tablet (5 mg) by mouth 3 times daily as needed (Patient not taking: Reported on 5/14/2018) 30 tablet 0     doxycycline (VIBRAMYCIN) 100 MG capsule Take 1 capsule (100 mg) by mouth 2 times daily (Patient not taking: Reported on 9/9/2018) 14 capsule 0     fluticasone (FLONASE) 50 MCG/ACT spray Spray 1-2 sprays into both nostrils daily (Patient not taking: Reported on 9/9/2018) 1 Bottle 11     Social History   Substance Use Topics     Smoking status: Current Every Day Smoker     Packs/day: 0.50     Years: 15.00     Types: Cigarettes     Smokeless tobacco: Never Used      Comment: 1/2 PPD     Alcohol use Yes      Comment: 10 per month       ROS:  10 point ROS of systems including Constitutional, Eyes, Respiratory, Cardiovascular, Gastroenterology, Genitourinary, Integumentary, Psychiatric were all negative except for pertinent positives noted in my HPI           OBJECTIVE:  /80 (Cuff Size: Adult Regular)  Pulse 80  Temp 98.5  F (36.9  C) (Oral)  Resp 20  Wt 187 lb (84.8 kg)  BMI 28.86 kg/m2  Back examination: Back symmetric, no curvature. ROM normal. No CVA tenderness., positive findings: limitation of motion - flexion: Moderate and extension: Moderate  [unfilled] leg raise test: positive  GENERAL APPEARANCE: healthy, alert and no distress  RESP:good effort , no SOB  CV: regular rates and rhythm,  ABDOMEN:  soft, nontender, no HSM or  masses and bowel sounds normal  NEURO: Normal strength and tone with no weakness or sensory deficit noted, reflexes normal   SKIN: no suspicious lesions or rashes    ASSESSMENT/IMPRESSION:  May was seen today for back pain.    Diagnoses and all orders for this visit:    Acute right-sided low back pain with right-sided sciatica  -     cyclobenzaprine (FLEXERIL) 5 MG tablet; Take 1 tablet (5 mg) by mouth 2 times daily as needed for muscle spasms  -     methylPREDNISolone (MEDROL DOSEPAK) 4 MG tablet; Follow package instructions  -     acetaminophen-codeine (TYLENOL #3) 300-30 MG per tablet; Take 1 tablet by mouth 2 times daily as needed for severe pain          PLAN:1) PLEASE SEE ORDER SUMMARY  [unfilled]:      1.  Continue stretching and strengthening exercises.       2.  Continue prn heat or ice application.      3, advised to follow up with ortho soon for more definitive treatment   4. Advised not to drive when taking tylenol or flexeril  Follow up if  symptoms fail to improve or worsens   Pt understood and agreed with plan

## 2018-10-17 ENCOUNTER — OFFICE VISIT (OUTPATIENT)
Dept: INTERNAL MEDICINE | Facility: CLINIC | Age: 43
End: 2018-10-17
Payer: COMMERCIAL

## 2018-10-17 VITALS
TEMPERATURE: 98.3 F | WEIGHT: 198 LBS | SYSTOLIC BLOOD PRESSURE: 110 MMHG | HEART RATE: 87 BPM | DIASTOLIC BLOOD PRESSURE: 68 MMHG | OXYGEN SATURATION: 98 % | BODY MASS INDEX: 30.55 KG/M2

## 2018-10-17 DIAGNOSIS — J30.2 SEASONAL ALLERGIC RHINITIS, UNSPECIFIED TRIGGER: ICD-10-CM

## 2018-10-17 DIAGNOSIS — J01.90 ACUTE SINUSITIS WITH SYMPTOMS > 10 DAYS: Primary | ICD-10-CM

## 2018-10-17 DIAGNOSIS — Z23 NEED FOR TDAP VACCINATION: ICD-10-CM

## 2018-10-17 DIAGNOSIS — Z29.3 NEED FOR PROPHYLACTIC FLUORIDE ADMINISTRATION: ICD-10-CM

## 2018-10-17 DIAGNOSIS — F17.200 TOBACCO USE DISORDER: ICD-10-CM

## 2018-10-17 PROBLEM — Z86.32 HISTORY OF DIABETES MELLITUS ARISING IN PREGNANCY: Status: ACTIVE | Noted: 2018-10-17

## 2018-10-17 PROCEDURE — 99214 OFFICE O/P EST MOD 30 MIN: CPT | Performed by: PHYSICIAN ASSISTANT

## 2018-10-17 RX ORDER — DOXYCYCLINE 100 MG/1
100 CAPSULE ORAL 2 TIMES DAILY
Qty: 20 CAPSULE | Refills: 0 | Status: SHIPPED | OUTPATIENT
Start: 2018-10-17 | End: 2018-10-27

## 2018-10-17 NOTE — MR AVS SNAPSHOT
"              After Visit Summary   10/17/2018    May Arrieta    MRN: [id number]           Patient Information     Date Of Birth          1975        Visit Information        Provider Department      10/17/2018 10:00 AM Deyanira Urbano PA-C [facility]        Today's Diagnoses     Acute sinusitis with symptoms > 10 days    -  1    Seasonal allergic rhinitis, unspecified trigger        Tobacco use disorder        Need for Tdap vaccination        Need for prophylactic fluoride administration           Follow-ups after your visit        Follow-up notes from your care team     Return in about 2 weeks (around 10/31/2018), or if symptoms worsen or fail to improve.      Who to contact     If you have questions or need follow up information about today's clinic visit or your schedule please contact [facility] directly at [phone].  Normal or non-critical lab and imaging results will be communicated to you by Jinnhart, letter or phone within 4 business days after the clinic has received the results. If you do not hear from us within 7 days, please contact the clinic through Jinnhart or phone. If you have a critical or abnormal lab result, we will notify you by phone as soon as possible.  Submit refill requests through [employer] or call your pharmacy and they will forward the refill request to us. Please allow 3 business days for your refill to be completed.          Additional Information About Your Visit        MyChart Information     [employer] lets you send messages to your doctor, view your test results, renew your prescriptions, schedule appointments and more. To sign up, go to [email or web address] . Click on \"Log in\" on the left side of the screen, which will take you to the Welcome page. Then click on \"Sign up Now\" on the right side of the page.     You will be asked to enter the access code listed below, as well as some personal information. Please " follow the directions to create your username and password.     Your access code is: [email or web address]  Expires: 2018 11:49 AM     Your access code will  in 90 days. If you need help or a new code, please call your [place] clinic or [phone].        Care EveryWhere ID     This is your Care EveryWhere ID. This could be used by other organizations to access your [place] medical records  [id number]        Your Vitals Were     Pulse Temperature Pulse Oximetry Breastfeeding? BMI (Body Mass Index)       87 98.3  F (36.8  C) (Oral) 98% No 30.55 kg/m2        Blood Pressure from Last 3 Encounters:   10/17/18 110/68   18 140/80   18 110/80    Weight from Last 3 Encounters:   10/17/18 198 lb (89.8 kg)   18 187 lb (84.8 kg)   18 187 lb 4.8 oz (85 kg)              Today, you had the following     No orders found for display         Today's Medication Changes          These changes are accurate as of 10/17/18 10:07 AM.  If you have any questions, ask your nurse or doctor.               These medicines have changed or have updated prescriptions.        Dose/Directions    cyclobenzaprine 5 MG tablet   Commonly known as:  FLEXERIL   This may have changed:  Another medication with the same name was removed. Continue taking this medication, and follow the directions you see here.   Used for:  Acute right-sided low back pain with right-sided sciatica   Changed by:  Deyanira Urbano PA-C        Dose:  5 mg   Take 1 tablet (5 mg) by mouth 2 times daily as needed for muscle spasms   Quantity:  20 tablet   Refills:  0         Stop taking these medicines if you haven't already. Please contact your care team if you have questions.     azithromycin 250 MG tablet   Commonly known as:  ZITHROMAX   Stopped by:  Deyanira Urbano PA-C           methylPREDNISolone 4 MG tablet   Commonly known as:  MEDROL DOSEPAK   Stopped by:  Deyanira Urbano PA-C                Where to get your medicines       These medications were sent to [facility] [place], [place] - [address]  [address] [facility] [address]     Phone:  [phone]     doxycycline 100 MG capsule                Primary Care Provider Office Phone # Fax #    Spike Pederson [phone]       [address]  [facility] [address]        Equal Access to Services     CRYSTAL SALGADO : Hadii aad ku hadasho Soomaali, waaxda luqadaha, qaybta kaalmada adeegyada, waxay idiin hayaan adeeg kharash la'aan . So [facility] [phone].    ATENCIÓN: Si habla español, tiene a jones disposición servicios gratuitos de asistencia lingüística. Llame al [phone].    We comply with applicable federal civil rights laws and [place] laws. We do not discriminate on the basis of race, color, national origin, age, disability, sex, sexual orientation, or gender identity.            Thank you!     Thank you for choosing [facility]  for your care. Our goal is always to provide you with excellent care. Hearing back from our patients is one way we can continue to improve our services. Please take a few minutes to complete the written survey that you may receive in the mail after your visit with us. Thank you!             Your Updated Medication List - Protect others around you: Learn how to safely use, store and throw away your medicines at www.disposemymeds.org.          This list is accurate as of 10/17/18 10:07 AM.  Always use your most recent med list.                   Brand Name Dispense Instructions for use Diagnosis    acetaminophen-codeine 300-30 MG per tablet    TYLENOL #3    6 tablet    Take 1 tablet by mouth 2 times daily as needed for severe pain    Acute right-sided low back pain with right-sided sciatica       albuterol 108 (90 Base) MCG/ACT inhaler    PROAIR HFA/PROVENTIL HFA/VENTOLIN HFA    1 Inhaler    Inhale 1-2 puffs into the lungs every 4 hours as needed for shortness of breath / dyspnea  or wheezing    Acute bronchitis with symptoms > 10 days       cetirizine 10 MG tablet    zyrTEC    30 tablet    Take 1 tablet (10 mg) by mouth every evening    Chronic rhinitis, unspecified type       cyclobenzaprine 5 MG tablet    FLEXERIL    20 tablet    Take 1 tablet (5 mg) by mouth 2 times daily as needed for muscle spasms    Acute right-sided low back pain with right-sided sciatica       doxycycline 100 MG capsule    VIBRAMYCIN    20 capsule    Take 1 capsule (100 mg) by mouth 2 times daily for 10 days    Acute sinusitis with symptoms > 10 days       fluticasone 50 MCG/ACT spray    FLONASE    1 Bottle    Spray 1-2 sprays into both nostrils daily    Chronic rhinitis, unspecified type

## 2018-10-17 NOTE — PROGRESS NOTES
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   Was doing otc, steroid nasal spray and netti pot and was getting some relief, then symptoms worsened x 1 week with additional cold symptoms.   No fever, but feels very fatigued and aches     -------------------------------------    Problem list and histories reviewed & adjusted, as indicated.  Additional history: as documented    Labs reviewed in EPIC    Reviewed and updated as needed this visit by clinical staff       Reviewed and updated as needed this visit by Provider  Allergies  Meds         ROS:  Constitutional, HEENT, cardiovascular, pulmonary, gi and gu systems are negative, except as otherwise noted.    OBJECTIVE:     /68  Pulse 87  Temp 98.3  F (36.8  C) (Oral)  Wt 198 lb (89.8 kg)  SpO2 98%  Breastfeeding? No  BMI 30.55 kg/m2  Body mass index is 30.55 kg/(m^2).  GENERAL: healthy, alert and no distress  HENT: normal cephalic/atraumatic, ear canals and TM's normal, nose and mouth without ulcers or lesions, nasal mucosa edematous , rhinorrhea clear and thick, oral mucous membranes moist, sinuses: maxillary tenderness on left and slight injection of pharynx   NECK: no adenopathy, no asymmetry, masses, or scars and thyroid normal to palpation  RESP:  lungs clear to auscultation - no rales, rhonchi or wheezes  CV: regular rates and rhythm and normal S1 S2, no S3 or S4  MS: no gross musculoskeletal defects noted, no edema  SKIN: no suspicious lesions or rashes    Diagnostic Test Results:  none     ASSESSMENT/PLAN:             1. Acute sinusitis with symptoms > 10 days    - doxycycline (VIBRAMYCIN) 100 MG capsule; Take 1 capsule (100 mg) by mouth 2 times daily for 10 days  Dispense: 20 capsule; Refill: 0    2. Seasonal allergic rhinitis, unspecified trigger      3. Tobacco use disorder    Need for flu and Tdap vaccination    Reviewed treatment   Saline irrigation  Steaming , warm compress to the face.  Steroid nasal spray  Fluids rest  Abx as direced   Recheck in 2 weeks not improving   Reviewed smoking cessation.    Reviewed immunizations recommend updates when she is feeling better.             Deyanira Urbano PA-C  [facility]

## 2018-10-22 ENCOUNTER — OFFICE VISIT (OUTPATIENT)
Dept: INTERNAL MEDICINE | Facility: CLINIC | Age: 43
End: 2018-10-22
Payer: COMMERCIAL

## 2018-10-22 VITALS
WEIGHT: 199.6 LBS | BODY MASS INDEX: 30.8 KG/M2 | DIASTOLIC BLOOD PRESSURE: 82 MMHG | OXYGEN SATURATION: 99 % | RESPIRATION RATE: 12 BRPM | HEART RATE: 95 BPM | TEMPERATURE: 98.1 F | SYSTOLIC BLOOD PRESSURE: 118 MMHG

## 2018-10-22 DIAGNOSIS — F17.200 TOBACCO USE DISORDER: ICD-10-CM

## 2018-10-22 DIAGNOSIS — J01.90 ACUTE SINUSITIS WITH SYMPTOMS > 10 DAYS: Primary | ICD-10-CM

## 2018-10-22 DIAGNOSIS — R05.9 COUGH: ICD-10-CM

## 2018-10-22 PROCEDURE — 99214 OFFICE O/P EST MOD 30 MIN: CPT | Performed by: PHYSICIAN ASSISTANT

## 2018-10-22 RX ORDER — ALBUTEROL SULFATE 90 UG/1
1-2 AEROSOL, METERED RESPIRATORY (INHALATION) EVERY 4 HOURS PRN
Qty: 1 INHALER | Refills: 0 | Status: SHIPPED | OUTPATIENT
Start: 2018-10-22 | End: 2019-07-16

## 2018-10-22 RX ORDER — AZITHROMYCIN 250 MG/1
TABLET, FILM COATED ORAL
Qty: 6 TABLET | Refills: 0 | Status: SHIPPED | OUTPATIENT
Start: 2018-10-22 | End: 2019-06-20

## 2018-10-22 NOTE — MR AVS SNAPSHOT
After Visit Summary   10/22/2018    May Arrieta    MRN: [id number]           Patient Information     Date Of Birth          1975        Visit Information        Provider Department      10/22/2018 10:40 AM Deyanira Urbano PA-C [facility]        Today's Diagnoses     Acute sinusitis with symptoms > 10 days    -  1    Tobacco use disorder        Cough           Follow-ups after your visit        Who to contact     If you have questions or need follow up information about today's clinic visit or your schedule please contact [facility] directly at [phone].  Normal or non-critical lab and imaging results will be communicated to you by MyChart, letter or phone within 4 business days after the clinic has received the results. If you do not hear from us within 7 days, please contact the clinic through MyChart or phone. If you have a critical or abnormal lab result, we will notify you by phone as soon as possible.  Submit refill requests through [employer] or call your pharmacy and they will forward the refill request to us. Please allow 3 business days for your refill to be completed.          Additional Information About Your Visit        Care EveryWhere ID     This is your Care EveryWhere ID. This could be used by other organizations to access your [facility] medical records  [id number]        Your Vitals Were     Pulse Temperature Respirations Pulse Oximetry Breastfeeding? BMI (Body Mass Index)    95 98.1  F (36.7  C) (Oral) 12 99% No 30.8 kg/m2       Blood Pressure from Last 3 Encounters:   10/22/18 118/82   10/17/18 110/68   09/09/18 140/80    Weight from Last 3 Encounters:   10/22/18 199 lb 9.6 oz (90.5 kg)   10/17/18 198 lb (89.8 kg)   09/09/18 187 lb (84.8 kg)              Today, you had the following     No orders found for display         Today's Medication Changes          These changes are accurate as of 10/22/18 11:04 AM.  If  you have any questions, ask your nurse or doctor.               Start taking these medicines.        Dose/Directions    azithromycin 250 MG tablet   Commonly known as:  ZITHROMAX   Used for:  Acute sinusitis with symptoms > 10 days   Started by:  Deyanira Urbano PA-C        Two tablets first day, then one tablet daily for four days.   Quantity:  6 tablet   Refills:  0            Where to get your medicines      These medications were sent to [facility] [address]  [address] [place] [address]     Phone:  [phone]     albuterol 108 (90 Base) MCG/ACT inhaler    azithromycin 250 MG tablet                Primary Care Provider Office Phone # Fax #    Spike Pederson [phone]       [address] [place] [id number]        Equal Access to Services     CRYSTAL SALGADO : Hadii hillary sanchez hadasho Sokaitlynali, waaxda luqadaha, qaybta kaalmada adeegyada, nita alves . So [facility] [phone].    ATENCIÓN: Si habla español, tiene a jones disposición servicios gratuitos de asistencia lingüística. [facility] [phone].    We comply with applicable federal civil rights laws and [place] laws. We do not discriminate on the basis of race, color, national origin, age, disability, sex, sexual orientation, or gender identity.            Thank you!     Thank you for choosing [facility]  for your care. Our goal is always to provide you with excellent care. Hearing back from our patients is one way we can continue to improve our services. Please take a few minutes to complete the written survey that you may receive in the mail after your visit with us. Thank you!             Your Updated Medication List - Protect others around you: Learn how to safely use, store and throw away your medicines at www.disposemymeds.org.          This list is accurate as of 10/22/18 11:04 AM.  Always use your most recent med list.                    Brand Name Dispense Instructions for use Diagnosis    albuterol 108 (90 Base) MCG/ACT inhaler    PROAIR HFA/PROVENTIL HFA/VENTOLIN HFA    1 Inhaler    Inhale 1-2 puffs into the lungs every 4 hours as needed for shortness of breath / dyspnea or wheezing    Cough       azithromycin 250 MG tablet    ZITHROMAX    6 tablet    Two tablets first day, then one tablet daily for four days.    Acute sinusitis with symptoms > 10 days       cetirizine 10 MG tablet    zyrTEC    30 tablet    Take 1 tablet (10 mg) by mouth every evening    Chronic rhinitis, unspecified type       doxycycline 100 MG capsule    VIBRAMYCIN    20 capsule    Take 1 capsule (100 mg) by mouth 2 times daily for 10 days    Acute sinusitis with symptoms > 10 days       fluticasone 50 MCG/ACT spray    FLONASE    1 Bottle    Spray 1-2 sprays into both nostrils daily    Chronic rhinitis, unspecified type

## 2018-10-22 NOTE — PROGRESS NOTES
SUBJECTIVE:   May Arrieta is a 43 year old female who presents to clinic today for the following health issues:      Acute Illness   Acute illness concerns: Cold  Onset: 10/09/18    Fever: no    Chills/Sweats: no    Headache (location?): YES    Sinus Pressure:YES    Conjunctivitis:  no    Ear Pain: no    Rhinorrhea: YES    Congestion: YES    Sore Throat: YES   Cough: YES    Wheeze: yes and hard to breathe at night/heaviness in chest, chest hurts worse than last visit    Decreased Appetite: no    Nausea: no    Vomiting: no    Diarrhea:  no    Dysuria/Freq.: no    Fatigue/Achiness: YES- achiness    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  Issues with heartburn with the doxycycline. Not tolerating.       -------------------------------------    Problem list and histories reviewed & adjusted, as indicated.  Additional history: as documented    Labs reviewed in EPIC    Reviewed and updated as needed this visit by clinical staff  Tobacco  Allergies  Meds  Soc Hx      Reviewed and updated as needed this visit by Provider  Allergies  Meds         ROS:  Constitutional, HEENT, cardiovascular, pulmonary, gi and gu systems are negative, except as otherwise noted.    OBJECTIVE:     /82  Pulse 95  Temp 98.1  F (36.7  C) (Oral)  Resp 12  Wt 199 lb 9.6 oz (90.5 kg)  SpO2 99%  Breastfeeding? No  BMI 30.8 kg/m2  Body mass index is 30.8 kg/(m^2).  GENERAL: healthy, alert and no distress  HENT: normal cephalic/atraumatic, ear canals and TM's normal, nose and mouth without ulcers or lesions, nasal mucosa edematous , oropharynx clear and oral mucous membranes moist  NECK: no adenopathy, no asymmetry, masses, or scars and thyroid normal to palpation  RESP: lungs clear to auscultation - no  rales, rhonchi or wheezes  CV: regular rates and rhythm and normal S1 S2, no S3 or S4  MS: no gross musculoskeletal defects noted, no edema  SKIN: no suspicious lesions or rashes    Diagnostic Test Results:  none     ASSESSMENT/PLAN:             1. Acute sinusitis with symptoms > 10 days  Not improve, need to change abx due to intolerance   - azithromycin (ZITHROMAX) 250 MG tablet; Two tablets first day, then one tablet daily for four days.  Dispense: 6 tablet; Refill: 0    2. Tobacco use disorder      3. Cough    - albuterol (PROAIR HFA/PROVENTIL HFA/VENTOLIN HFA) 108 (90 Base) MCG/ACT inhaler; Inhale 1-2 puffs into the lungs every 4 hours as needed for shortness of breath / dyspnea or wheezing  Dispense: 1 Inhaler; Refill: 0    Did not tolerate doxycycline, - nausea heartburn   Stop doxy  Start Zpak  Albuterol prn cough/chest congestion.        Deyanira Urbano PA-C  [facility]

## 2019-01-21 ENCOUNTER — OFFICE VISIT (OUTPATIENT)
Dept: OBGYN | Facility: CLINIC | Age: 44
End: 2019-01-21
Payer: COMMERCIAL

## 2019-01-21 VITALS — BODY MASS INDEX: 30.24 KG/M2 | WEIGHT: 196 LBS | DIASTOLIC BLOOD PRESSURE: 76 MMHG | SYSTOLIC BLOOD PRESSURE: 110 MMHG

## 2019-01-21 DIAGNOSIS — Z00.00 ANNUAL PHYSICAL EXAM: Primary | ICD-10-CM

## 2019-01-21 LAB
ERYTHROCYTE [DISTWIDTH] IN BLOOD BY AUTOMATED COUNT: 12.6 % (ref 10–15)
HCT VFR BLD AUTO: 42.1 % (ref 35–47)
HGB BLD-MCNC: 13.7 G/DL (ref 11.7–15.7)
MCH RBC QN AUTO: 32.2 PG (ref 26.5–33)
MCHC RBC AUTO-ENTMCNC: 32.5 G/DL (ref 31.5–36.5)
MCV RBC AUTO: 99 FL (ref 78–100)
PLATELET # BLD AUTO: 298 10E9/L (ref 150–450)
RBC # BLD AUTO: 4.25 10E12/L (ref 3.8–5.2)
SPECIMEN SOURCE: ABNORMAL
TSH SERPL DL<=0.005 MIU/L-ACNC: 1.51 MU/L (ref 0.4–4)
WBC # BLD AUTO: 10.9 10E9/L (ref 4–11)
WET PREP SPEC: ABNORMAL

## 2019-01-21 PROCEDURE — 87591 N.GONORRHOEAE DNA AMP PROB: CPT | Performed by: ADVANCED PRACTICE MIDWIFE

## 2019-01-21 PROCEDURE — 84443 ASSAY THYROID STIM HORMONE: CPT | Performed by: ADVANCED PRACTICE MIDWIFE

## 2019-01-21 PROCEDURE — 87210 SMEAR WET MOUNT SALINE/INK: CPT | Performed by: ADVANCED PRACTICE MIDWIFE

## 2019-01-21 PROCEDURE — 87491 CHLMYD TRACH DNA AMP PROBE: CPT | Performed by: ADVANCED PRACTICE MIDWIFE

## 2019-01-21 PROCEDURE — 85027 COMPLETE CBC AUTOMATED: CPT | Performed by: ADVANCED PRACTICE MIDWIFE

## 2019-01-21 PROCEDURE — 36415 COLL VENOUS BLD VENIPUNCTURE: CPT | Performed by: ADVANCED PRACTICE MIDWIFE

## 2019-01-21 PROCEDURE — 99386 PREV VISIT NEW AGE 40-64: CPT | Performed by: ADVANCED PRACTICE MIDWIFE

## 2019-01-21 NOTE — PROGRESS NOTES
"May is a 43 year old  female who presents for annual exam.     Besides routine health maintenance,  she would like to discuss painful intercourse, generalized pelvic discomfort, changes in menses, and blood noted coming from her 's penis after intercourse..  HPI:  Patient states that she has been dealing with painful intercourse for likely over 10 years. Thinks the timeline corresponds with when she has Essure performed. She states that pain is not due to vaginal dryness or irritation, but a feeling of \"painful pressure. Sometimes it's bad enough, we have to stop having sex if I can't find a comfortable position\". Additionally, she notes feeling pelvic pressure and intermittent cramping between her menses. Denies dysuria, unusual vaginal discharge or odor. She reports that in the past year or so her periods have changed from her baseline. She states they are longer, heavier, and have more cramping than they used to and are accompanied by hot flashes. Of note, in the last two weeks she has stated that after intercourse, on two separate occasions, there has been blood coming from her 's urethra. He is declining to seek medical care so she wanted to receive an annual exam.  Menses are regular q 28-30 days and crampy and heavy lasting 7 days.   Menses flow: medium to heavy, but without clots.    Patient's last menstrual period was 2018 (approximate)..   Using Essure for contraception.    She is not currently considering pregnancy.    REPRODUCTIVE/GYNECOLOGIC HISTORY:  May is sexually active with male partner(s) and is currently in monogamous relationship.   STI testing offered?  Accepted  History of abnormal Pap smear:  No  SOCIAL HISTORY  Abuse: does not report having previously been physical or sexually abused.    Do you feel safe in your environment? YES    She  reports that she has been smoking cigarettes.  She has a 7.50 pack-year smoking history. she has never used smokeless " tobacco.  Tobacco Cessation Action Plan: Information offered: Patient not interested at this time    Last PHQ-9 score on record = No flowsheet data found.  Last GAD7 score on record =   NADYA-7 SCORE 2016   Total Score 14         HEALTH MAINTENANCE:  Cholesterol: (  Cholesterol   Date Value Ref Range Status   2011 178 0 - 200 mg/dL Final     Comment:     LDL Cholesterol is the primary guide to therapy.   The NCEP recommends further evaluation of: patients with cholesterol greater   than 200 mg/dL if additional risk factors are present, cholesterol greater   than   240 mg/dL, triglycerides greater than 150 mg/dL, or HDL less than 40 mg/dL.   2007 151 0 - 200 mg/dL Final     Comment:     LDL Cholesterol is the primary guide to therapy: LDL-cholesterol goal in high   risk patients is <100 mg/dL and in very high risk patients is <70 mg/dL.   The NCEP recommends further evaluation of: patients with cholesterol <200 mg/dL   if additional risk factors are present, cholesterol >240 mg/dL, triglycerides   >150 mg/dL, or HDL <40 mg/dL.    History of abnormal lipids: No  Mammo: ordered . History of abnormal Mammo: No, .  Regular Self Breast Exams: No  TSH: (  TSH   Date Value Ref Range Status   2011 1.11 0.4 - 5.0 mU/L Final    )  Pap; (  Lab Results   Component Value Date    PAP NIL 2016    PAP NIL 2011    PAP NIL 2010    )  Immunizations up to date: yes  (Gardasil, Tdap, Flu)  Health maintenance updated:  yes      HISTORY:  Obstetric History       T2      L2     SAB0   TAB0   Ectopic0   Multiple0   Live Births1       # Outcome Date GA Lbr Shaun/2nd Weight Sex Delivery Anes PTL Lv   2 Term 05 39w0d  3.062 kg (6 lb 12 oz) F VACUUM   DILLON      Name: Ami      Apgar1:  8                Apgar5: 9   1 Term 94 40w0d 05:00 3.487 kg (7 lb 11 oz) M          Name: FLORIN        Past Medical History:   Diagnosis Date     Allergic rhinitis, cause unspecified      Back  pain     last 16 years     Bipolar I disorder, most recent episode (or current) unspecified      Diabetes mellitus of mother, complicating pregnancy, childbirth, or the puerperium, unspecified as to episode of care(648.00)      Diabetes mellitus of mother, complicating pregnancy, childbirth, or the puerperium, unspecified as to episode of care(648.00)      Hyperlipidemia LDL goal <160 10/31/2010     Hysteroscopic sterilization by ESSURE--3/30/10 3/30/2010     Lumbago 2/1/2010     Ovarian cyst 2/3/2009     Ovarian cyst      Pain in joint, pelvic region and thigh 2/1/2010     Tobacco use disorder      Past Surgical History:   Procedure Laterality Date     C NONSPECIFIC PROCEDURE      Cosmetic Surgery on Ears     HC HYSTEROS W PERMANENT FALLOPAIN IMPLANT  3/2010    documented tubal occlusion by HSG on 7/21/10     LAPAROSCOPIC CYSTECTOMY OVARIAN (BENIGN)  5/12/2013    Procedure: LAPAROSCOPIC CYSTECTOMY OVARIAN (BENIGN);  Diagnostic laparoscopy, left ovarian cystectomy ;  Surgeon: Ignacia Madrid DO;  Location:  OR     LAPAROSCOPY DIAGNOSTIC (GYN)  5/12/2013    Procedure: LAPAROSCOPY DIAGNOSTIC (GYN);;  Surgeon: Ignacia Madrid DO;  Location: [facility] OR     Family History   Problem Relation Age of Onset     Depression Mother      Diabetes Mother      Hypertension Mother      Diabetes Paternal Grandmother      Breast Cancer Paternal Grandmother      Diabetes Paternal Grandfather      C.A.D. Paternal Grandfather         mi in his 50s     Cancer Maternal Grandmother         colon     Thyroid Disease Daughter         asthma     Allergies Daughter      Allergies Son      C.A.D. Maternal Grandfather         MI in his 50s     Social History     Socioeconomic History     Marital status:      Spouse name: None     Number of children: 2     Years of education: None     Highest education level: None   Social Needs     Financial resource strain: None     Food insecurity - worry: None     Food insecurity - inability:  None     Transportation needs - medical: None     Transportation needs - non-medical: None   Occupational History     Employer: [employer]   Tobacco Use     Smoking status: Current Every Day Smoker     Packs/day: 0.50     Years: 15.00     Pack years: 7.50     Types: Cigarettes     Smokeless tobacco: Never Used     Tobacco comment: 1/2 PPD   Substance and Sexual Activity     Alcohol use: Yes     Comment: 10 per month     Drug use: No     Sexual activity: Yes     Partners: Male     Birth control/protection: Inserts     Comment: Essure   Other Topics Concern      Service No     Blood Transfusions No     Caffeine Concern No     Occupational Exposure No     Hobby Hazards No     Sleep Concern No     Stress Concern Yes     Weight Concern Yes     Special Diet No     Back Care Yes     Exercise No     Comment: active     Bike Helmet Not Asked     Seat Belt Yes     Self-Exams No     Parent/sibling w/ CABG, MI or angioplasty before 65F 55M? Not Asked   Social History Narrative    Living arrangements - the patient lives with her partner and her children       Current Outpatient Medications:      albuterol (PROAIR HFA/PROVENTIL HFA/VENTOLIN HFA) 108 (90 Base) MCG/ACT inhaler, Inhale 1-2 puffs into the lungs every 4 hours as needed for shortness of breath / dyspnea or wheezing, Disp: 1 Inhaler, Rfl: 0     azithromycin (ZITHROMAX) 250 MG tablet, Two tablets first day, then one tablet daily for four days. (Patient not taking: Reported on 1/21/2019), Disp: 6 tablet, Rfl: 0     cetirizine (ZYRTEC) 10 MG tablet, Take 1 tablet (10 mg) by mouth every evening (Patient not taking: Reported on 1/21/2019), Disp: 30 tablet, Rfl: 11     fluticasone (FLONASE) 50 MCG/ACT spray, Spray 1-2 sprays into both nostrils daily (Patient not taking: Reported on 1/21/2019), Disp: 1 Bottle, Rfl: 11     Allergies   Allergen Reactions     Albuterol Other (See Comments)     Felt hot and vomited - update: pt has used this med after her reaction listed and  did not have any negative reaction     Doxycycline Nausea     Penicillins Nausea and Vomiting and Other (See Comments)     Headache, fever     Pollen Extract      Other reaction(s): Itching  Headache       Past medical, surgical, social and family history were reviewed and updated in EPIC.    ROS:   CONSTITUTIONAL: NEGATIVE for fever, chills, change in weight  INTEGUMENTARU/SKIN: NEGATIVE for worrisome rashes, moles or lesions  EYES: NEGATIVE for vision changes or irritation  ENT: NEGATIVE for ear, mouth and throat problems  RESP: NEGATIVE for significant cough or SOB  BREAST: NEGATIVE for masses, tenderness or discharge  CV: NEGATIVE for chest pain, palpitations or peripheral edema  GI: NEGATIVE for nausea, abdominal pain, heartburn, or change in bowel habits  : NEGATIVE for unusual urinary or vaginal symptoms. Periods are regular.  MUSCULOSKELETAL: NEGATIVE for significant arthralgias or myalgia  NEURO: NEGATIVE for weakness, dizziness or paresthesias  PSYCHIATRIC: NEGATIVE for changes in mood or affect    PHYSICAL EXAM:  /76 (BP Location: Left arm, Cuff Size: Adult Regular)   Wt 88.9 kg (196 lb)   LMP 12/31/2018 (Approximate)   BMI 30.24 kg/m     BMI: Body mass index is 30.24 kg/m .  Constitutional: healthy, alert and no distress  Neck: symmetrical, thyroid normal size, no masses present, no lymphadenopathy present.   Cardiovascular: RRR, no murmurs present  Respiratory: breathing unlabored, lungs CTA bilaterally  Breast:normal without masses, tenderness or nipple discharge and no palpable axillary masses or adenopathy  Gastrointestinal: abdomen soft, non-tender, bowel sounds present  PELVIC EXAM:  Vulva: No lesions, no adenopathy, BUS WNL  Vagina: Moist, pink, discharge normal  well rugated, no lesions  Cervix:smooth, pink, no visible lesions  Uterus: Normal size, non-tender, mobile  Ovaries: No masses palpated  Rectal exam: deferred    ASSESSMENT/PLAN:    ICD-10-CM    1. Annual physical exam Z00.00  Neisseria gonorrhoeae PCR     Chlamydia trachomatis PCR     TSH with free T4 reflex     CBC with platelets     Wet prep     US Pelvic Complete w Transvaginal     *MA Screening Digital Bilateral     Lipid Profile (Chol, Trig, HDL, LDL calc)     Results for orders placed or performed during the hospital encounter of 04/13/17   MA Screening Digital Bilateral    Narrative    SCREENING MAMMOGRAM, BILATERAL, DIGITAL w/CAD - 4/13/2017 12:02 PM    BREAST SYMPTOMS: No current breast complaints.     COMPARISON:  Baseline.    BREAST DENSITY: Heterogeneously dense.    COMMENTS: No findings of suspicion for malignancy.       Impression    IMPRESSION: BI-RADS CATEGORY: 1 -  Negative    RECOMMENDED FOLLOW-UP: Annual Mammography.    Exam results letter mailed to patient.      ALEXIS SANTOS MD     Orders Placed This Encounter   Procedures     US Pelvic Complete w Transvaginal     *MA Screening Digital Bilateral     TSH with free T4 reflex     CBC with platelets     Lipid Profile (Chol, Trig, HDL, LDL calc)         COUNSELING:   Discussed perimenopausal expectations of changes in menstrual cycle.   Discussed possible causes of pelvic pain - will await lab results. If all return normal, would recommend following up with an OB to discuss her concerns of Essure.  Encouraged her to inform her  it would be in his best interest to seek medical care.   reports that she has been smoking cigarettes.  She has a 7.50 pack-year smoking history. she has never used smokeless tobacco.  Tobacco Cessation Action Plan: Information offered: Patient not interested at this time    Dennise Burleson CNM on 1/21/2019 at 6:07 PM

## 2019-01-21 NOTE — NURSING NOTE
"Chief Complaint   Patient presents with     Physical     Pelvic Pain     during intercourse       Initial /76 (BP Location: Left arm, Cuff Size: Adult Regular)   Wt 88.9 kg (196 lb)   LMP 2018 (Approximate)   BMI 30.24 kg/m   Estimated body mass index is 30.24 kg/m  as calculated from the following:    Height as of 18: 1.715 m (5' 7.5\").    Weight as of this encounter: 88.9 kg (196 lb).  BP completed using cuff size: regular    Questioned patient about current smoking habits.  Pt. currently smokes.  Advised about smoking cessation.          The following HM Due: NONE      Yocasta Perez CMA             "

## 2019-01-22 ENCOUNTER — RESULT FOLLOW UP (OUTPATIENT)
Dept: OBGYN | Facility: CLINIC | Age: 44
End: 2019-01-22

## 2019-01-22 DIAGNOSIS — B37.31 YEAST INFECTION OF THE VAGINA: Primary | ICD-10-CM

## 2019-01-22 LAB
C TRACH DNA SPEC QL NAA+PROBE: NEGATIVE
N GONORRHOEA DNA SPEC QL NAA+PROBE: NEGATIVE
SPECIMEN SOURCE: NORMAL
SPECIMEN SOURCE: NORMAL

## 2019-01-22 RX ORDER — FLUCONAZOLE 150 MG/1
150 TABLET ORAL ONCE
Qty: 1 TABLET | Refills: 0 | Status: SHIPPED | OUTPATIENT
Start: 2019-01-22 | End: 2019-06-20

## 2019-06-05 ENCOUNTER — HOSPITAL ENCOUNTER (EMERGENCY)
Facility: CLINIC | Age: 44
Discharge: HOME | End: 2019-06-06
Attending: EMERGENCY MEDICINE | Admitting: EMERGENCY MEDICINE
Payer: COMMERCIAL

## 2019-06-05 DIAGNOSIS — R10.2 PELVIC PAIN IN FEMALE: ICD-10-CM

## 2019-06-05 DIAGNOSIS — N83.201 CYST OF RIGHT OVARY: ICD-10-CM

## 2019-06-05 PROCEDURE — 99285 EMERGENCY DEPT VISIT HI MDM: CPT | Mod: 25

## 2019-06-05 ASSESSMENT — MIFFLIN-ST. JEOR: SCORE: 1599.82

## 2019-06-05 NOTE — ED AVS SNAPSHOT
Emergency Department  [address] [place] [id number]  Phone:  [phone]  Fax:  [phone]                                    May Arrieta   MRN: [id number]    Department:   Emergency Department   Date of Visit:  6/5/2019           After Visit Summary Signature Page    I have received my discharge instructions, and my questions have been answered. I have discussed any challenges I see with this plan with the nurse or doctor.    ..........................................................................................................................................  Patient/Patient Representative Signature      ..........................................................................................................................................  Patient Representative Print Name and Relationship to Patient    ..................................................               ................................................  Date                                   Time    ..........................................................................................................................................  Reviewed by Signature/Title    ...................................................              ..............................................  Date                                               Time          22EPIC Rev 08/18

## 2019-06-06 ENCOUNTER — APPOINTMENT (OUTPATIENT)
Dept: ULTRASOUND IMAGING | Facility: CLINIC | Age: 44
End: 2019-06-06
Attending: EMERGENCY MEDICINE
Payer: COMMERCIAL

## 2019-06-06 VITALS
HEART RATE: 83 BPM | HEIGHT: 67 IN | WEIGHT: 200 LBS | OXYGEN SATURATION: 98 % | TEMPERATURE: 98.5 F | DIASTOLIC BLOOD PRESSURE: 71 MMHG | SYSTOLIC BLOOD PRESSURE: 112 MMHG | RESPIRATION RATE: 18 BRPM | BODY MASS INDEX: 31.39 KG/M2

## 2019-06-06 LAB
ALBUMIN SERPL-MCNC: 3.7 G/DL (ref 3.4–5)
ALBUMIN UR-MCNC: NEGATIVE MG/DL
ALP SERPL-CCNC: 73 U/L (ref 40–150)
ALT SERPL W P-5'-P-CCNC: 48 U/L (ref 0–50)
ANION GAP SERPL CALCULATED.3IONS-SCNC: 7 MMOL/L (ref 3–14)
APPEARANCE UR: CLEAR
AST SERPL W P-5'-P-CCNC: 30 U/L (ref 0–45)
BASOPHILS # BLD AUTO: 0 10E9/L (ref 0–0.2)
BASOPHILS NFR BLD AUTO: 0.2 %
BILIRUB SERPL-MCNC: 0.5 MG/DL (ref 0.2–1.3)
BILIRUB UR QL STRIP: NEGATIVE
BUN SERPL-MCNC: 10 MG/DL (ref 7–30)
CALCIUM SERPL-MCNC: 8.6 MG/DL (ref 8.5–10.1)
CHLORIDE SERPL-SCNC: 110 MMOL/L (ref 94–109)
CO2 SERPL-SCNC: 24 MMOL/L (ref 20–32)
COLOR UR AUTO: YELLOW
CREAT SERPL-MCNC: 0.83 MG/DL (ref 0.52–1.04)
DIFFERENTIAL METHOD BLD: ABNORMAL
EOSINOPHIL # BLD AUTO: 0.2 10E9/L (ref 0–0.7)
EOSINOPHIL NFR BLD AUTO: 1.7 %
ERYTHROCYTE [DISTWIDTH] IN BLOOD BY AUTOMATED COUNT: 12.8 % (ref 10–15)
GFR SERPL CREATININE-BSD FRML MDRD: 87 ML/MIN/{1.73_M2}
GLUCOSE SERPL-MCNC: 99 MG/DL (ref 70–99)
GLUCOSE UR STRIP-MCNC: NEGATIVE MG/DL
HCG SERPL QL: NEGATIVE
HCT VFR BLD AUTO: 40 % (ref 35–47)
HGB BLD-MCNC: 13.4 G/DL (ref 11.7–15.7)
HGB UR QL STRIP: NEGATIVE
IMM GRANULOCYTES # BLD: 0 10E9/L (ref 0–0.4)
IMM GRANULOCYTES NFR BLD: 0.2 %
KETONES UR STRIP-MCNC: NEGATIVE MG/DL
LEUKOCYTE ESTERASE UR QL STRIP: ABNORMAL
LYMPHOCYTES # BLD AUTO: 4.2 10E9/L (ref 0.8–5.3)
LYMPHOCYTES NFR BLD AUTO: 32.2 %
MCH RBC QN AUTO: 32.2 PG (ref 26.5–33)
MCHC RBC AUTO-ENTMCNC: 33.5 G/DL (ref 31.5–36.5)
MCV RBC AUTO: 96 FL (ref 78–100)
MONOCYTES # BLD AUTO: 1 10E9/L (ref 0–1.3)
MONOCYTES NFR BLD AUTO: 7.9 %
MUCOUS THREADS #/AREA URNS LPF: PRESENT /LPF
NEUTROPHILS # BLD AUTO: 7.6 10E9/L (ref 1.6–8.3)
NEUTROPHILS NFR BLD AUTO: 57.8 %
NITRATE UR QL: NEGATIVE
PH UR STRIP: 5.5 PH (ref 5–7)
PLATELET # BLD AUTO: 300 10E9/L (ref 150–450)
POTASSIUM SERPL-SCNC: 3.8 MMOL/L (ref 3.4–5.3)
PROT SERPL-MCNC: 7.1 G/DL (ref 6.8–8.8)
RBC # BLD AUTO: 4.16 10E12/L (ref 3.8–5.2)
RBC #/AREA URNS AUTO: 1 /HPF (ref 0–2)
SODIUM SERPL-SCNC: 141 MMOL/L (ref 133–144)
SOURCE: ABNORMAL
SP GR UR STRIP: 1.01 (ref 1–1.03)
SQUAMOUS #/AREA URNS AUTO: 1 /HPF (ref 0–1)
UROBILINOGEN UR STRIP-MCNC: NORMAL MG/DL (ref 0–2)
WBC # BLD AUTO: 13.1 10E9/L (ref 4–11)
WBC #/AREA URNS AUTO: 3 /HPF (ref 0–5)

## 2019-06-06 PROCEDURE — 80053 COMPREHEN METABOLIC PANEL: CPT | Performed by: EMERGENCY MEDICINE

## 2019-06-06 PROCEDURE — 93976 VASCULAR STUDY: CPT

## 2019-06-06 PROCEDURE — 84703 CHORIONIC GONADOTROPIN ASSAY: CPT | Performed by: EMERGENCY MEDICINE

## 2019-06-06 PROCEDURE — 25000128 H RX IP 250 OP 636: Performed by: EMERGENCY MEDICINE

## 2019-06-06 PROCEDURE — 96375 TX/PRO/DX INJ NEW DRUG ADDON: CPT

## 2019-06-06 PROCEDURE — 96376 TX/PRO/DX INJ SAME DRUG ADON: CPT

## 2019-06-06 PROCEDURE — 81001 URINALYSIS AUTO W/SCOPE: CPT | Performed by: EMERGENCY MEDICINE

## 2019-06-06 PROCEDURE — 96374 THER/PROPH/DIAG INJ IV PUSH: CPT

## 2019-06-06 PROCEDURE — 85025 COMPLETE CBC W/AUTO DIFF WBC: CPT | Performed by: EMERGENCY MEDICINE

## 2019-06-06 RX ORDER — HYDROCODONE BITARTRATE AND ACETAMINOPHEN 5; 325 MG/1; MG/1
1 TABLET ORAL EVERY 6 HOURS PRN
Qty: 6 TABLET | Refills: 0 | Status: SHIPPED | OUTPATIENT
Start: 2019-06-06 | End: 2019-06-20

## 2019-06-06 RX ORDER — KETOROLAC TROMETHAMINE 15 MG/ML
15 INJECTION, SOLUTION INTRAMUSCULAR; INTRAVENOUS ONCE
Status: COMPLETED | OUTPATIENT
Start: 2019-06-06 | End: 2019-06-06

## 2019-06-06 RX ORDER — IBUPROFEN 600 MG/1
600 TABLET, FILM COATED ORAL EVERY 6 HOURS PRN
Qty: 30 TABLET | Refills: 0 | Status: SHIPPED | OUTPATIENT
Start: 2019-06-06 | End: 2021-06-03

## 2019-06-06 RX ORDER — HYDROMORPHONE HYDROCHLORIDE 1 MG/ML
0.5 INJECTION, SOLUTION INTRAMUSCULAR; INTRAVENOUS; SUBCUTANEOUS
Status: DISCONTINUED | OUTPATIENT
Start: 2019-06-06 | End: 2019-06-06 | Stop reason: HOSPADM

## 2019-06-06 RX ORDER — ONDANSETRON 2 MG/ML
4 INJECTION INTRAMUSCULAR; INTRAVENOUS EVERY 30 MIN PRN
Status: DISCONTINUED | OUTPATIENT
Start: 2019-06-06 | End: 2019-06-06 | Stop reason: HOSPADM

## 2019-06-06 RX ADMIN — KETOROLAC TROMETHAMINE 15 MG: 15 INJECTION, SOLUTION INTRAMUSCULAR; INTRAVENOUS at 00:22

## 2019-06-06 RX ADMIN — HYDROMORPHONE HYDROCHLORIDE 0.5 MG: 1 INJECTION, SOLUTION INTRAMUSCULAR; INTRAVENOUS; SUBCUTANEOUS at 00:25

## 2019-06-06 RX ADMIN — HYDROMORPHONE HYDROCHLORIDE 0.5 MG: 1 INJECTION, SOLUTION INTRAMUSCULAR; INTRAVENOUS; SUBCUTANEOUS at 00:48

## 2019-06-06 RX ADMIN — ONDANSETRON 4 MG: 2 INJECTION INTRAMUSCULAR; INTRAVENOUS at 01:43

## 2019-06-06 RX ADMIN — ONDANSETRON 4 MG: 2 INJECTION INTRAMUSCULAR; INTRAVENOUS at 00:23

## 2019-06-06 ASSESSMENT — ENCOUNTER SYMPTOMS: ABDOMINAL PAIN: 1

## 2019-06-06 NOTE — ED PROVIDER NOTES
"  History     Chief Complaint:  Abdominal pain    HPI   May Arrieta is a 43 year old female who presents with her  to the emergency department with abdominal pain. The patient and  report that they were having intercourse one hour ago when the patient had a sudden onset of diffuse low abdominal pain which radiates diffusely superiorly and feels the same as when her ovarian cyst burst in her remote history . She has not noted any vaginal bleeding with this, however, with the intensity of the pain they presented immediately here. She had no concerns before this quick onset of pain. She has no concerns for pregnancy as she has a Nexplanon. She did have one episode of emesis secondary to pain.       Allergies:  Albuterol  Doxycycline  Penicillins      Medications:    Albuterol  Azithromycin    Past Medical History:    Ovarian cyst  Lumbago  HLD  DM  Bipolar I    Past Surgical History:    Tubal ligation  Laparoscopic cystectomy, ovarian  Hysterectomy with fallopian implant  Ear, cosmetic    Family History:    Depression   DM  HTN  Breast cancer  CAD    Social History:  Smoker: 0.5 ppd, 15 years  Positive for alcohol use.        Review of Systems   Gastrointestinal: Positive for abdominal pain.   Genitourinary: Negative for vaginal bleeding.   All other systems reviewed and are negative.      Physical Exam     Patient Vitals for the past 24 hrs:   BP Temp Temp src Pulse Heart Rate Resp SpO2 Height Weight   06/06/19 0316 112/71 -- -- 83 -- 18 98 % -- --   06/06/19 0143 107/59 -- -- 84 -- -- -- -- --   06/06/19 0106 -- -- -- -- -- 18 -- -- --   06/06/19 0047 104/70 -- -- 89 -- -- -- -- --   06/06/19 0045 -- -- -- -- -- 20 98 % -- --   06/05/19 2334 113/77 98.5  F (36.9  C) Oral -- 104 18 96 % 1.702 m (5' 7\") 90.7 kg (200 lb)       Physical Exam      Constitutional:  Appears well-developed and well-nourished. Cooperative. Looks uncomfortable. On all fours on bed. Looks anxious. tearful and uncomfortable. " Curled up in her bed in a fetal position.   HENT:   Head:    Atraumatic.   Mouth/Throat:   Oropharynx is without erythema or exudate and mucous     membranes are moist.   Eyes:    Conjunctivae normal and EOM are normal.      Pupils are equal, round, and reactive to light.   Neck:    Normal range of motion. Neck supple.   Cardiovascular:  Normal rate, regular rhythm, normal heart sounds and radial and    dorsalis pedis pulses are 2+ and symmetric.    Pulmonary/Chest:  Effort normal and breath sounds normal.   Abdominal:   Mildly diffusely tender across the lower abdomen, right more than left, no CVA tenderness.  Musculoskeletal:  Normal range of motion. No edema and no tenderness.   Neurological:  Alert. Normal strength. No cranial nerve deficit. GCS 15.  Skin:    Skin is warm and dry.   Psychiatric:   Normal mood and affect.     Emergency Department Course     Imaging:  Radiographic findings were communicated with the patient who voiced understanding of the findings.    US Pelvic, Complete w Transvaginal & Abd/Pel Duplex Limited:  1. Dominant follicle right ovary and minimal free fluid in the right  adnexal region.  2. Otherwise negative. as per radiology.     Laboratory:    CBC: WBC: 13.1, HGB: 13.4, PLT: 300  CMP: Chloride: 110, o/w WNL (Creatinine: 0.83)    UA with Microscopic: Leukocyte esterase: trace, Mucous: present, o/w WNL  HCG: Negative    Interventions:    The patient's symptoms were improved with parenteral narcotics.  0022 Toradol 15 mg IV  0048 Dilaudid 0.5 mg IV  0143 Zofran 4 mg IV      Emergency Department Course:  Nursing notes and vitals reviewed. (0003) I performed an exam of the patient as documented above.     IV inserted. Medicine administered as documented above. Blood drawn. This was sent to the lab for further testing, results above.    The patient was sent for a Pelvic US while in the emergency department, findings above.     (0245) I rechecked the patient and discussed the results of her  workup thus far.     Findings and plan explained to the Patient. Patient discharged home with instructions regarding supportive care, medications, and reasons to return. The importance of close follow-up was reviewed. The patient was prescribed Norco, Ibuprofen.     I personally reviewed the laboratory results with the Patient and answered all related questions prior to discharge.     Impression & Plan      Medical Decision Making:  May Arrieta is a 43 year old female who presents with pelvic pain.  They look overall well.  A broad differential diagnosis was considered including colitis, appendicitis, intestinal cramping, pyelonephritis, UTI, kidney stone, constipation, diverticulitis, volvulus, ileus, obstruction, pregnancy (ectopic or intrauterine), ovarian cyst (enlarged or ruptured), ovarian torsion, PID, etc as possibilities.   The workup in the ED shows ovarian cyst.  She understands this may or not be the source of pain.  She denies discharge or risk factor for STI.  I doubt PID or TOA given clear findings of cyst without signs of abscess.  No other etiology for the patients pain is found at this point and my suspicion of an intraabdominal catastrophe or other worrisome etiology is very low.  I will not therefore admit for serial exams and further workup.  Patient is hemodynamically stable in ED. Plan is home with 4 week recheck by ultrasound of cyst to ensure resolution.  Return for fevers greater than 102, increasing pain, other new symptoms develop.  Abdominal pain handout given.  Questions were answered.       Diagnosis:    ICD-10-CM    1. Pelvic pain in female R10.2    2. Cyst of right ovary N83.201        Disposition:  discharged to home    Discharge Medications:     Medication List      Started    HYDROcodone-acetaminophen 5-325 MG tablet  Commonly known as:  NORCO  1 tablet, Oral, EVERY 6 HOURS PRN     ibuprofen 600 MG tablet  Commonly known as:  ADVIL/MOTRIN  600 mg, Oral, EVERY 6 HOURS PRN             Scribe Disclosure:  I, Zachariah Taylor, am serving as a scribe on 6/6/2019 at 12:02 AM to personally document services performed by George Vu MD based on my observations and the provider's statements to me.     Zachariah Taylor  6/5/2019    EMERGENCY DEPARTMENT       George Vu MD  06/06/19 0961

## 2019-06-12 ENCOUNTER — TELEPHONE (OUTPATIENT)
Dept: OBGYN | Facility: CLINIC | Age: 44
End: 2019-06-12

## 2019-06-12 ENCOUNTER — OFFICE VISIT (OUTPATIENT)
Dept: URGENT CARE | Facility: URGENT CARE | Age: 44
End: 2019-06-12
Payer: COMMERCIAL

## 2019-06-12 VITALS
HEART RATE: 86 BPM | WEIGHT: 200 LBS | OXYGEN SATURATION: 99 % | DIASTOLIC BLOOD PRESSURE: 85 MMHG | SYSTOLIC BLOOD PRESSURE: 123 MMHG | BODY MASS INDEX: 31.32 KG/M2 | RESPIRATION RATE: 16 BRPM | TEMPERATURE: 98.5 F

## 2019-06-12 DIAGNOSIS — J01.90 ACUTE SINUSITIS WITH SYMPTOMS > 10 DAYS: Primary | ICD-10-CM

## 2019-06-12 DIAGNOSIS — K12.0 CANKER SORE: ICD-10-CM

## 2019-06-12 PROCEDURE — 99214 OFFICE O/P EST MOD 30 MIN: CPT | Performed by: FAMILY MEDICINE

## 2019-06-12 RX ORDER — AZITHROMYCIN 250 MG/1
TABLET, FILM COATED ORAL
Qty: 6 TABLET | Refills: 0 | Status: SHIPPED | OUTPATIENT
Start: 2019-06-12 | End: 2019-06-20

## 2019-06-12 NOTE — PROGRESS NOTES
SUBJECTIVE: May Arrieta is a 43 year old female presenting with a chief complaint of nasal congestion, facial pain/pressure and sore roof of mouth.  Onset of symptoms was 2 week(s) ago.  Course of illness is worsening.    Severity moderate  Current and Associated symptoms: runny nose and stuffy nose  Treatment measures tried include None tried.  Predisposing factors include None.    Past Medical History:   Diagnosis Date     Allergic rhinitis, cause unspecified      Back pain     last 16 years     Bipolar I disorder, most recent episode (or current) unspecified      Diabetes mellitus of mother, complicating pregnancy, childbirth, or the puerperium, unspecified as to episode of care(648.00)      Diabetes mellitus of mother, complicating pregnancy, childbirth, or the puerperium, unspecified as to episode of care(648.00)      Hyperlipidemia LDL goal <160 10/31/2010     Hysteroscopic sterilization by ESSURE--3/30/10 3/30/2010     Lumbago 2/1/2010     Ovarian cyst 2/3/2009     Ovarian cyst      Pain in joint, pelvic region and thigh 2/1/2010     Tobacco use disorder      Allergies   Allergen Reactions     Albuterol Other (See Comments)     Felt hot and vomited - update: pt has used this med after her reaction listed and did not have any negative reaction     Doxycycline Nausea     Penicillins Nausea and Vomiting and Other (See Comments)     Headache, fever     Pollen Extract      Other reaction(s): Itching  Headache     Social History     Tobacco Use     Smoking status: Current Every Day Smoker     Packs/day: 0.50     Years: 15.00     Pack years: 7.50     Types: Cigarettes     Smokeless tobacco: Never Used     Tobacco comment: 1/2 PPD   Substance Use Topics     Alcohol use: Yes     Comment: 10 per month       ROS:  SKIN: no rash  GI: no vomiting    OBJECTIVE:  /85 (BP Location: Left arm, Patient Position: Sitting, Cuff Size: Adult Regular)   Pulse 86   Temp 98.5  F (36.9  C) (Oral)   Resp 16   Wt 90.7 kg  (200 lb)   LMP 06/01/2019 (LMP Unknown)   SpO2 99%   BMI 31.32 kg/m  GENERAL APPEARANCE: healthy, alert and no distress  EYES: EOMI,  PERRL, conjunctiva clear  HENT: TM's normal bilaterally, rhinorrhea yellow, oral mucous membranes moist, no erythema noted, maxillary sinus tenderness  and roof of mouth with ulcer  NECK: supple, nontender, no lymphadenopathy  RESP: lungs clear to auscultation - no rales, rhonchi or wheezes  SKIN: no suspicious lesions or rashes      ICD-10-CM    1. Acute sinusitis with symptoms > 10 days J01.90 azithromycin (ZITHROMAX) 250 MG tablet   2. Canker sore K12.0 magic mouthwash (ENTER INGREDIENTS IN COMMENTS) suspension       Fluids/Rest, f/u if worse/not any better

## 2019-06-12 NOTE — TELEPHONE ENCOUNTER
Patient calling regarding follow up for ovarian cyst.  Was seen in ER on 6/5/19.  Not in pain now, but aware of a sensation.  Last ovarian cyst was 1 yr ago.  Has Essure.   Appt schedule with Dr Multani.  Fern Moreno RN

## 2019-06-13 DIAGNOSIS — J31.0 CHRONIC RHINITIS: ICD-10-CM

## 2019-06-14 RX ORDER — CETIRIZINE HYDROCHLORIDE 10 MG/1
10 TABLET ORAL EVERY EVENING
Qty: 30 TABLET | Refills: 3 | Status: SHIPPED | OUTPATIENT
Start: 2019-06-14 | End: 2020-09-07

## 2019-06-14 NOTE — TELEPHONE ENCOUNTER
"Requested Prescriptions   Pending Prescriptions Disp Refills     cetirizine (ZYRTEC) 10 MG tablet 30 tablet 11     Sig: Take 1 tablet (10 mg) by mouth every evening       Antihistamines Protocol Passed - 6/13/2019  8:59 AM        Passed - Patient is 3-64 years of age     Apply weight-based dosing for peds patients age 3 - 12 years of age.    Forward request to provider for patients under the age of 3 or over the age of 64.          Passed - Recent (12 mo) or future (30 days) visit within the authorizing provider's specialty     Patient had office visit in the last 12 months or has a visit in the next 30 days with authorizing provider or within the authorizing provider's specialty.  See \"Patient Info\" tab in inbasket, or \"Choose Columns\" in Meds & Orders section of the refill encounter.              Passed - Medication is active on med list        Last Written Prescription Date:  05/14/18  Last Fill Quantity: 30,  # refills: 11   Last office visit: 10/22/2018 with prescribing provider:  05/14/18   Future Office Visit:   Next 5 appointments (look out 90 days)    Jun 20, 2019 11:00 AM CDT  Office Visit with Abe Multani MD  [facility] ([facility]) [address]  [address]  [facility] [address] [phone]           "

## 2019-06-20 ENCOUNTER — OFFICE VISIT (OUTPATIENT)
Dept: OBGYN | Facility: CLINIC | Age: 44
End: 2019-06-20
Payer: COMMERCIAL

## 2019-06-20 VITALS — WEIGHT: 195.5 LBS | SYSTOLIC BLOOD PRESSURE: 108 MMHG | DIASTOLIC BLOOD PRESSURE: 70 MMHG | BODY MASS INDEX: 30.62 KG/M2

## 2019-06-20 DIAGNOSIS — R10.2 PELVIC PAIN IN FEMALE: Primary | ICD-10-CM

## 2019-06-20 DIAGNOSIS — N83.201 CYST OF RIGHT OVARY: ICD-10-CM

## 2019-06-20 DIAGNOSIS — N94.10 DYSPAREUNIA IN FEMALE: ICD-10-CM

## 2019-06-20 PROCEDURE — 99214 OFFICE O/P EST MOD 30 MIN: CPT | Performed by: OBSTETRICS & GYNECOLOGY

## 2019-06-20 NOTE — NURSING NOTE
"Chief Complaint   Patient presents with     Pelvic Pain     c/o ovarian cysts painful.  Always has a stomach ache - unsure if related x 8-10 years       Initial /70   Wt 88.7 kg (195 lb 8 oz)   LMP 2019   BMI 30.62 kg/m   Estimated body mass index is 30.62 kg/m  as calculated from the following:    Height as of 19: 1.702 m (5' 7\").    Weight as of this encounter: 88.7 kg (195 lb 8 oz).  BP completed using cuff size: large    Questioned patient about current smoking habits.  Pt. currently smokes.  Advised about smoking cessation.          The following HM Due: Vaccinations: Tdap    Joanie Mejia CMA               "

## 2019-06-20 NOTE — PROGRESS NOTES
"  SUBJECTIVE:                                                   May Arrieta is a 43 year old female P2 who presents to clinic today for the following health issue(s):  Patient presents with:  Pelvic Pain: c/o ovarian cysts painful.  Always has a stomach ache - unsure if related x 8-10 years      HPI:  43-year-old para 2 presents in follow-up of an ER visit on .  She was seen for pelvic pain during intercourse which was sudden in onset.  No excessive vaginal bleeding was described.    Patient states the pain is improved for the most part as has happened with prior ruptured ovarian cysts.  The ultrasound obtained in the ED is reviewed and shows a dominant follicle in the right ovary measuring 2.1 cm and minimal free fluid in the right adnexa.  The ultrasound was otherwise negative.  No comment was made regarding visualization of Essure devices.    Patient states she has had long-standing chronic pelvic pain of a vague nature.  \"I always have a stomachache\" in clarification this is really a pelvic ache.  Describes deep dyspareunia which is causing problems in her sex life.  This is central and consistent in its nature.  This is been going on for years    .Patient's last menstrual period was 2019.    .   Patient is sexually active, .  Using Essure for contraception.     Problem list and histories reviewed & adjusted, as indicated.  Additional history: as documented.    Patient Active Problem List   Diagnosis     Bipolar I disorder, most recent episode (or current) unspecified     Tobacco use disorder     Allergic rhinitis     Ovarian cyst     Lumbago     Pain in joint, pelvic region and thigh     Status post tubal ligation     HYPERLIPIDEMIA LDL GOAL <160     Fear of flying     History of diabetes mellitus arising in pregnancy     Past Surgical History:   Procedure Laterality Date     C NONSPECIFIC PROCEDURE      Cosmetic Surgery on Ears     HC HYSTEROS W PERMANENT FALLOPAIN IMPLANT  3/2010    " documented tubal occlusion by HSG on 7/21/10     LAPAROSCOPIC CYSTECTOMY OVARIAN (BENIGN)  5/12/2013    Procedure: LAPAROSCOPIC CYSTECTOMY OVARIAN (BENIGN);  Diagnostic laparoscopy, left ovarian cystectomy ;  Surgeon: Ignacia Madrid DO;  Location:  OR     LAPAROSCOPY DIAGNOSTIC (GYN)  5/12/2013    Procedure: LAPAROSCOPY DIAGNOSTIC (GYN);;  Surgeon: Ignacia Madrid DO;  Location:  OR      Social History     Tobacco Use     Smoking status: Current Every Day Smoker     Packs/day: 0.50     Years: 15.00     Pack years: 7.50     Types: Cigarettes     Smokeless tobacco: Never Used     Tobacco comment: 1/2 PPD   Substance Use Topics     Alcohol use: Yes     Comment: 10 per month      Problem (# of Occurrences) Relation (Name,Age of Onset)    Allergies (2) Daughter, Son    Breast Cancer (1) Paternal Grandmother    C.A.D. (2) Paternal Grandfather: mi in his 50s, Maternal Grandfather: MI in his 50s    Cancer (1) Maternal Grandmother: colon    Depression (1) Mother    Diabetes (3) Mother, Paternal Grandmother, Paternal Grandfather    Hypertension (1) Mother    Thyroid Disease (1) Daughter: asthma              Current Outpatient Medications on File Prior to Visit:  cetirizine (ZYRTEC) 10 MG tablet Take 1 tablet (10 mg) by mouth every evening   ibuprofen (ADVIL/MOTRIN) 600 MG tablet Take 1 tablet (600 mg) by mouth every 6 hours as needed for moderate pain   albuterol (PROAIR HFA/PROVENTIL HFA/VENTOLIN HFA) 108 (90 Base) MCG/ACT inhaler Inhale 1-2 puffs into the lungs every 4 hours as needed for shortness of breath / dyspnea or wheezing   fluticasone (FLONASE) 50 MCG/ACT spray Spray 1-2 sprays into both nostrils daily (Patient not taking: Reported on 1/21/2019)     No current facility-administered medications on file prior to visit.   Allergies   Allergen Reactions     Albuterol Other (See Comments)     Felt hot and vomited - update: pt has used this med after her reaction listed and did not have any negative  reaction     Doxycycline Nausea     Penicillins Nausea and Vomiting and Other (See Comments)     Headache, fever     Pollen Extract      Other reaction(s): Itching  Headache       ROS:  5 point ROS negative except as noted above in HPI, including Gen., Resp., CV, GI &  system review.    OBJECTIVE:     /70   Wt 88.7 kg (195 lb 8 oz)   LMP 06/16/2019   BMI 30.62 kg/m     BMI: Body mass index is 30.62 kg/m .  General: Alert and oriented, no distress.  Psychiatric: Mood and affect within normal limits.  Skin: Warm and dry, no lesions, rashes or discolorations.  .   Abdomen: Soft, nontender, no hepatosplenomegaly, no rebound or guarding, no masses, no hernias. Prior laparoscopy scars noted  Vulva:  No external lesions, normal female hair distribution, no inguinal adenopathy.    Urethra:  Midline, non-tender, well supported, no discharge  Vagina:  Well-estrogenized, no abnormal discharge, no lesions  Cervix: no lesions, no discharge and multiparous  Uterus:  anteverted, smooth contour, without enlargement, mobile, and without tenderness, smooth, tender over fundus and cervical motion tenderness, no masses  Ovaries:  No masses appreciated, non-tender, mobile  Rectal Exam: deferred  Musculoskeletal: extremities normal      ASSESSMENT/PLAN:                                                        ICD-10-CM    1. Pelvic pain in female R10.2    2. Dyspareunia in female N94.10    3. Cyst of right ovary N83.201          43-year-old para 2 with Essure devices placed in 2010 now with chronic pelvic pain, dyspareunia and irregular bleeding.  Recurrent symptomatic ovarian cysts distinguishes this pain syndrome as very different from her chronic pain.    I discussed with the patient that there are many reports of chronic pelvic pain with a Essure devices.  Her chronic pelvic pain and deep dyspareunia would fit with these descriptions.  Definitive management would entail a hysterectomy and bilateral salpingectomy.  Given her  history of recurrent symptomatic ovarian cysts and concerns about hormonal mood changes removal of the ovaries would also be a consideration.    Patient is to consider the advice given today and contact the clinic or Dr. Madrid for a second opinion if she desires decides to proceed with definitive treatment of her chronic pelvic pain    Abe Multani MD  [facility]

## 2019-07-12 NOTE — PROGRESS NOTES
SUBJECTIVE:                                                   May Arrieta is a 44 year old female who presents to clinic today for the following health issue(s):  Patient presents with:  Consult: Patient would like a secnd opinion on whether or not she needs to get a hysterectomy as recommended by a different provider.    HPI:  May presents to discuss alternative management of her several symptoms. She has dyspareunia that has been present since her Essure was first placed over 10 years ago. She states that it is negatively affecting her relationship. With all of the press about the Essure she is not comfortable with them being in place. She also has heavy and painful menses. She has had ovarian cysts I nthe past but on her last sonogram on 19, there was a dominant follicle but no cysts were seen. She was counseled to have a total hysterectomy and BSO. She has had a Mirena IUD in the past but pulled it out when she felt the strings. She is a smoker with no plans to quit.    Patient's last menstrual period was 2019..   Patient is sexually active, .  Using Essure for contraception.    reports that she has been smoking cigarettes.  She has a 7.50 pack-year smoking history. She has never used smokeless tobacco.  Tobacco Cessation Action Plan: Information offered: Patient not interested at this time  STD testing offered?  Declined    Health maintenance updated:  yes    Today's PHQ-2 Score:   PHQ-2 (  [employer]) 2019   Q1: Little interest or pleasure in doing things 0   Q2: Feeling down, depressed or hopeless 0   PHQ-2 Score 0     Today's PHQ-9 Score:   PHQ-9 SCORE 2019   PHQ-9 Total Score 0     Today's NADYA-7 Score:   NADYA-7 SCORE 2019   Total Score 13       Problem list and histories reviewed & adjusted, as indicated.  Additional history: as documented.    Patient Active Problem List   Diagnosis     Bipolar I disorder, most recent episode (or current) unspecified      Tobacco use disorder     Allergic rhinitis     Ovarian cyst     Lumbago     Pain in joint, pelvic region and thigh     Status post tubal ligation     HYPERLIPIDEMIA LDL GOAL <160     Fear of flying     History of diabetes mellitus arising in pregnancy     Past Surgical History:   Procedure Laterality Date     C NONSPECIFIC PROCEDURE      Cosmetic Surgery on Ears     HC HYSTEROS W PERMANENT FALLOPAIN IMPLANT  3/2010    documented tubal occlusion by HSG on 7/21/10     LAPAROSCOPIC CYSTECTOMY OVARIAN (BENIGN)  5/12/2013    Procedure: LAPAROSCOPIC CYSTECTOMY OVARIAN (BENIGN);  Diagnostic laparoscopy, left ovarian cystectomy ;  Surgeon: Ignacia Madrid DO;  Location:  OR     LAPAROSCOPY DIAGNOSTIC (GYN)  5/12/2013    Procedure: LAPAROSCOPY DIAGNOSTIC (GYN);;  Surgeon: Ignacia Madrid DO;  Location:  OR      Social History     Tobacco Use     Smoking status: Current Every Day Smoker     Packs/day: 0.50     Years: 15.00     Pack years: 7.50     Types: Cigarettes     Smokeless tobacco: Never Used     Tobacco comment: 1/2 PPD   Substance Use Topics     Alcohol use: Yes     Comment: 10 per month      Problem (# of Occurrences) Relation (Name,Age of Onset)    Allergies (2) Daughter, Son    Breast Cancer (1) Paternal Grandmother    C.A.D. (2) Paternal Grandfather: mi in his 50s, Maternal Grandfather: MI in his 50s    Cancer (1) Maternal Grandmother: colon    Depression (1) Mother    Diabetes (3) Mother, Paternal Grandmother, Paternal Grandfather    Hypertension (1) Mother    Thyroid Disease (1) Daughter: asthma            Current Outpatient Medications   Medication Sig     cetirizine (ZYRTEC) 10 MG tablet Take 1 tablet (10 mg) by mouth every evening     fluticasone (FLONASE) 50 MCG/ACT spray Spray 1-2 sprays into both nostrils daily     ibuprofen (ADVIL/MOTRIN) 600 MG tablet Take 1 tablet (600 mg) by mouth every 6 hours as needed for moderate pain     norethindrone (MICRONOR) 0.35 MG tablet Take 1 tablet (0.35  "mg) by mouth daily     No current facility-administered medications for this visit.      Allergies   Allergen Reactions     Albuterol Other (See Comments)     Felt hot and vomited - update: pt has used this med after her reaction listed and did not have any negative reaction     Doxycycline Nausea     Penicillins Nausea and Vomiting and Other (See Comments)     Headache, fever     Pollen Extract      Other reaction(s): Itching  Headache       ROS:  12 point review of systems negative other than symptoms noted below.  Genitourinary: Heavy Bleeding with Period, Irregular Menses, Painful [place] and Pelvic Pain    OBJECTIVE:     /70   Ht 1.702 m (5' 7\")   Wt 89.6 kg (197 lb 9.6 oz)   LMP 06/16/2019   Breastfeeding? No   BMI 30.95 kg/m    Body mass index is 30.95 kg/m .    Exam:  Constitutional:  Appearance: Well nourished, well developed alert, in no acute distress  Chest:  Respiratory Effort:  Breathing unlabored  Neurologic/Psychiatric:  Mental Status:  Oriented X3   No Pelvic Exam performed     In-Clinic Test Results:  No results found for this or any previous visit (from the past 24 hour(s)).    ASSESSMENT/PLAN:                                                        ICD-10-CM    1. Abnormal uterine bleeding (AUB) N93.9 norethindrone (MICRONOR) 0.35 MG tablet     I counseled May that in the setting of smoking we are limited to progesterone only medical management. I counseled her that she can have a total hysterectomy without a BSO. She can also consider a laparoscopic removal of the Essure with a bilateral salpingectomy. She would like to start with the progesterone only pill and return for a consultation with Dr. Mckinney about laparoscopic removal of the Essure with a bilateral salpingectomy.    Palma Arrington MD  [facility] [place]    20 minutes was spent face to face with the patient today discussing her history, diagnosis, and follow-up plan as noted above. Over 50% of " the visit was spent in counseling and coordination of care.    Total Visit Time: 20 minutes.

## 2019-07-16 ENCOUNTER — OFFICE VISIT (OUTPATIENT)
Dept: OBGYN | Facility: CLINIC | Age: 44
End: 2019-07-16
Payer: COMMERCIAL

## 2019-07-16 VITALS
WEIGHT: 197.6 LBS | HEIGHT: 67 IN | DIASTOLIC BLOOD PRESSURE: 70 MMHG | BODY MASS INDEX: 31.01 KG/M2 | SYSTOLIC BLOOD PRESSURE: 120 MMHG

## 2019-07-16 DIAGNOSIS — N93.9 ABNORMAL UTERINE BLEEDING (AUB): Primary | ICD-10-CM

## 2019-07-16 PROCEDURE — 99214 OFFICE O/P EST MOD 30 MIN: CPT | Performed by: OBSTETRICS & GYNECOLOGY

## 2019-07-16 RX ORDER — ACETAMINOPHEN AND CODEINE PHOSPHATE 120; 12 MG/5ML; MG/5ML
0.35 SOLUTION ORAL DAILY
Qty: 84 TABLET | Refills: 3 | Status: SHIPPED | OUTPATIENT
Start: 2019-07-16 | End: 2021-06-17

## 2019-07-16 ASSESSMENT — ANXIETY QUESTIONNAIRES
1. FEELING NERVOUS, ANXIOUS, OR ON EDGE: MORE THAN HALF THE DAYS
5. BEING SO RESTLESS THAT IT IS HARD TO SIT STILL: MORE THAN HALF THE DAYS
3. WORRYING TOO MUCH ABOUT DIFFERENT THINGS: MORE THAN HALF THE DAYS
6. BECOMING EASILY ANNOYED OR IRRITABLE: MORE THAN HALF THE DAYS
IF YOU CHECKED OFF ANY PROBLEMS ON THIS QUESTIONNAIRE, HOW DIFFICULT HAVE THESE PROBLEMS MADE IT FOR YOU TO DO YOUR WORK, TAKE CARE OF THINGS AT HOME, OR GET ALONG WITH OTHER PEOPLE: NOT DIFFICULT AT ALL
GAD7 TOTAL SCORE: 13
7. FEELING AFRAID AS IF SOMETHING AWFUL MIGHT HAPPEN: SEVERAL DAYS
2. NOT BEING ABLE TO STOP OR CONTROL WORRYING: MORE THAN HALF THE DAYS

## 2019-07-16 ASSESSMENT — PATIENT HEALTH QUESTIONNAIRE - PHQ9
SUM OF ALL RESPONSES TO PHQ QUESTIONS 1-9: 0
5. POOR APPETITE OR OVEREATING: MORE THAN HALF THE DAYS

## 2019-07-16 ASSESSMENT — MIFFLIN-ST. JEOR: SCORE: 1583.94

## 2019-07-17 ASSESSMENT — ANXIETY QUESTIONNAIRES: GAD7 TOTAL SCORE: 13

## 2019-08-12 ENCOUNTER — MYC MEDICAL ADVICE (OUTPATIENT)
Dept: INTERNAL MEDICINE | Facility: CLINIC | Age: 44
End: 2019-08-12

## 2019-08-12 DIAGNOSIS — F40.243 FEAR OF FLYING: ICD-10-CM

## 2019-08-13 RX ORDER — LORAZEPAM 0.5 MG/1
0.5 TABLET ORAL EVERY 8 HOURS PRN
Qty: 6 TABLET | Refills: 0 | Status: CANCELLED | OUTPATIENT
Start: 2019-08-13

## 2019-08-13 NOTE — TELEPHONE ENCOUNTER
Please verify with patient as appears that I have not seen this patient for more than 4.5 years therefore I am unclear what patient's request is?  Patient will likely need an appointment if she is requesting benzodiazepines or sedative agents for travel.

## 2019-08-14 ENCOUNTER — ANCILLARY PROCEDURE (OUTPATIENT)
Dept: MAMMOGRAPHY | Facility: CLINIC | Age: 44
End: 2019-08-14
Attending: ADVANCED PRACTICE MIDWIFE
Payer: COMMERCIAL

## 2019-08-14 ENCOUNTER — OFFICE VISIT (OUTPATIENT)
Dept: INTERNAL MEDICINE | Facility: CLINIC | Age: 44
End: 2019-08-14
Payer: COMMERCIAL

## 2019-08-14 VITALS
TEMPERATURE: 98.4 F | SYSTOLIC BLOOD PRESSURE: 118 MMHG | BODY MASS INDEX: 31.25 KG/M2 | DIASTOLIC BLOOD PRESSURE: 80 MMHG | WEIGHT: 199.1 LBS | HEIGHT: 67 IN | OXYGEN SATURATION: 98 % | HEART RATE: 90 BPM | RESPIRATION RATE: 15 BRPM

## 2019-08-14 DIAGNOSIS — Z00.00 ANNUAL PHYSICAL EXAM: ICD-10-CM

## 2019-08-14 DIAGNOSIS — F17.200 TOBACCO USE DISORDER: ICD-10-CM

## 2019-08-14 DIAGNOSIS — Z00.00 ROUTINE GENERAL MEDICAL EXAMINATION AT A HEALTH CARE FACILITY: Primary | ICD-10-CM

## 2019-08-14 DIAGNOSIS — F31.9 BIPOLAR I DISORDER (H): ICD-10-CM

## 2019-08-14 DIAGNOSIS — E78.5 HYPERLIPIDEMIA LDL GOAL <160: ICD-10-CM

## 2019-08-14 DIAGNOSIS — F40.243 FEAR OF FLYING: ICD-10-CM

## 2019-08-14 DIAGNOSIS — Z12.31 VISIT FOR SCREENING MAMMOGRAM: ICD-10-CM

## 2019-08-14 LAB
CHOLEST SERPL-MCNC: 197 MG/DL
HDLC SERPL-MCNC: 46 MG/DL
LDLC SERPL CALC-MCNC: 133 MG/DL
NONHDLC SERPL-MCNC: 151 MG/DL
TRIGL SERPL-MCNC: 91 MG/DL

## 2019-08-14 PROCEDURE — 99396 PREV VISIT EST AGE 40-64: CPT | Performed by: INTERNAL MEDICINE

## 2019-08-14 PROCEDURE — 80061 LIPID PANEL: CPT | Performed by: INTERNAL MEDICINE

## 2019-08-14 PROCEDURE — 36415 COLL VENOUS BLD VENIPUNCTURE: CPT | Performed by: INTERNAL MEDICINE

## 2019-08-14 PROCEDURE — 99213 OFFICE O/P EST LOW 20 MIN: CPT | Mod: 25 | Performed by: INTERNAL MEDICINE

## 2019-08-14 PROCEDURE — 77067 SCR MAMMO BI INCL CAD: CPT | Mod: TC

## 2019-08-14 RX ORDER — GABAPENTIN 100 MG/1
CAPSULE ORAL
COMMUNITY
Start: 2019-08-13 | End: 2020-04-14

## 2019-08-14 RX ORDER — LORAZEPAM 0.5 MG/1
0.5 TABLET ORAL DAILY PRN
Qty: 4 TABLET | Refills: 0 | Status: SHIPPED | OUTPATIENT
Start: 2019-08-14 | End: 2020-04-14

## 2019-08-14 ASSESSMENT — MIFFLIN-ST. JEOR: SCORE: 1590.74

## 2019-08-14 NOTE — LETTER
[facility]  [address] [address]  [phone]      8/14/2019       May Arrieta  [address] [place] [id number]        Dear May,    Your cholesterol is just slightly high and could be treated more aggressively with better diet, exercise and weight loss.  Please follow-up with me to discuss your further medication options/changes if you are interested.    Sincerely,      Spike Pederson MD  Internal Medicine

## 2019-08-14 NOTE — PROGRESS NOTES
SUBJECTIVE:   CC: May Arrieta is an 44 year old woman who presents for preventive health visit.     Answers for HPI/ROS submitted by the patient on 8/14/2019   Annual Exam:  Frequency of exercise:: None  Getting at least 3 servings of Calcium per day:: Yes  Diet:: Regular (no restrictions)  Taking medications regularly:: Yes  Medication side effects:: None  Bi-annual eye exam:: Yes  Dental care twice a year:: Yes  Sleep apnea or symptoms of sleep apnea:: None  Additional concerns today:: No      PROBLEMS TO ADD ON...  1. Patient requesting medication to help with anxiety for future plane trip.  She states she gets extremely anxious prior to flying and will be traveling to [place] on an upcoming trip and would like something to help control her anxiety prior to the flight.  She has used the medication in the past without issue.  2. Seeing GYN for ongoing concerns with esure and pelvic cysts     Today's PHQ-2 Score:   PHQ-2 ( 1999 [employer]) 7/16/2019 8/19/2016   Q1: Little interest or pleasure in doing things 0 1   Q2: Feeling down, depressed or hopeless 0 1   PHQ-2 Score 0 2       Abuse: Current or Past(Physical, Sexual or Emotional)- No  Do you feel safe in your environment? Yes    Social History     Tobacco Use     Smoking status: Current Every Day Smoker     Packs/day: 0.50     Years: 15.00     Pack years: 7.50     Types: Cigarettes     Smokeless tobacco: Never Used     Tobacco comment: 1/2 PPD   Substance Use Topics     Alcohol use: Yes     Comment: 10 per month     If you drink alcohol do you typically have >3 drinks per day or >7 drinks per week? No                     Reviewed orders with patient.  Reviewed health maintenance and updated orders accordingly - Yes    Mammogram Screening: Patient under age 50, mutual decision reflected in health maintenance.    Pertinent mammograms are reviewed under the imaging tab.  History of abnormal Pap smear: NO - age 30-65 PAP every 5 years with negative HPV  "co-testing recommended  PAP / HPV Latest Ref Rng & Units 8/19/2016 4/4/2011 1/22/2010   PAP - NIL NIL NIL   HPV 16 DNA NEG Negative - -   HPV 18 DNA NEG Negative - -   OTHER HR HPV NEG Negative - -     Reviewed and updated as needed this visit by clinical staff       Reviewed and updated as needed this visit by Provider       ROS:  CONSTITUTIONAL: NEGATIVE for fever, chills, change in weight  ENT: NEGATIVE for ear, mouth and throat problems  RESP: NEGATIVE for significant cough or SOB  CV: NEGATIVE for chest pain, palpitations or peripheral edema  GI: NEGATIVE for nausea, abdominal pain, heartburn, or change in bowel habits  : NEGATIVE for unusual urinary or vaginal symptoms. Periods are regular.  MUSCULOSKELETAL: NEGATIVE for significant arthralgias or myalgia  NEURO: NEGATIVE for weakness, dizziness or paresthesias  PSYCHIATRIC: NEGATIVE for changes in mood or affect    OBJECTIVE:   /80   Pulse 90   Temp 98.4  F (36.9  C) (Oral)   Resp 15   Ht 1.702 m (5' 7\")   Wt 90.3 kg (199 lb 1.6 oz)   SpO2 98%   BMI 31.18 kg/m    EXAM:  GENERAL: alert and no distress  EYES: Eyes grossly normal to inspection, PERRL and conjunctivae and sclerae normal  HENT: ear canals and TM's normal, nose and mouth without ulcers or lesions  NECK: no adenopathy, no asymmetry, masses, or scars and thyroid normal to palpation  RESP: lungs clear to auscultation - no rales, rhonchi or wheezes  BREAST: normal without masses, tenderness or nipple discharge and no palpable axillary masses or adenopathy  CV: regular rate and rhythm, normal S1 S2, no S3 or S4, no click or rub, no peripheral edema and peripheral pulses strong  ABDOMEN: soft, nontender, no hepatosplenomegaly, no masses and bowel sounds normal  MS: no gross musculoskeletal defects noted  NEURO: No focal changes  PSYCH: mentation appears normal, affect normal/bright      ASSESSMENT/PLAN:   1. Routine general medical examination at a health care facility  Advised tetanus " "booster is routine update but patient has declined at this point    2. Bipolar I disorder, most recent episode (or current) unspecified  I will not remove this from the chart as the diagnosis was not placed by me but the patient does not feel that she is bipolar and is never seen a psychiatrist to confirm diagnosis.    3. Hyperlipidemia LDL goal <160  Labs ordered as fasting per routine.  - Lipid Profile    4. Tobacco use disorder  Smoking cessation was advised and the risks of continued smoking in regards to this patients health history was reiterated. Options of smoking cessation were also discussed. This time extended beyond the routine exam.    5. Fear of flying  Discussed use to medicine prior to flying and risks of potential side effects of sedation and to avoid alcohol use.  - LORazepam (ATIVAN) 0.5 MG tablet; Take 1 tablet (0.5 mg) by mouth daily as needed for anxiety Prior to flying  Dispense: 4 tablet; Refill: 0   OFFICE/OUTPT VISIT,TEODORA JENKINS III          COUNSELING:   Reviewed preventive health counseling, as reflected in patient instructions       Regular exercise       Healthy diet/nutrition    Estimated body mass index is 30.95 kg/m  as calculated from the following:    Height as of 7/16/19: 1.702 m (5' 7\").    Weight as of 7/16/19: 89.6 kg (197 lb 9.6 oz).       reports that she has been smoking cigarettes.  She has a 7.50 pack-year smoking history. She has never used smokeless tobacco.      Counseling Resources:  ATP IV Guidelines  Pooled Cohorts Equation Calculator  Breast Cancer Risk Calculator  FRAX Risk Assessment  ICSI Preventive Guidelines  Dietary Guidelines for Americans, 2010  USDA's MyPlate  ASA Prophylaxis  Lung CA Screening    Spike Pederson MD  [facility]    THE MEDICATION LIST HAS BEEN FULLY RECONCILED BY THE M.D. AND THE NURSING STAFF.    "

## 2019-09-30 ENCOUNTER — HEALTH MAINTENANCE LETTER (OUTPATIENT)
Age: 44
End: 2019-09-30

## 2020-03-24 ENCOUNTER — VIRTUAL VISIT (OUTPATIENT)
Dept: INTERNAL MEDICINE | Facility: CLINIC | Age: 45
End: 2020-03-24
Payer: COMMERCIAL

## 2020-03-24 VITALS — BODY MASS INDEX: 31.32 KG/M2 | TEMPERATURE: 98.1 F | WEIGHT: 200 LBS

## 2020-03-24 DIAGNOSIS — J01.90 ACUTE SINUSITIS WITH SYMPTOMS > 10 DAYS: Primary | ICD-10-CM

## 2020-03-24 PROCEDURE — 99442 ZZC PHYSICIAN TELEPHONE EVALUATION 11-20 MIN: CPT | Performed by: INTERNAL MEDICINE

## 2020-03-24 RX ORDER — LEVOFLOXACIN 500 MG/1
500 TABLET, FILM COATED ORAL DAILY
Qty: 7 TABLET | Refills: 0 | Status: SHIPPED | OUTPATIENT
Start: 2020-03-24 | End: 2020-04-14

## 2020-03-24 NOTE — PROGRESS NOTES
"May Arrieta is a 44 year old female who is being evaluated via a billable telephone visit.      The patient has been notified of following:     \"This telephone visit will be conducted via a call between you and your physician/provider. We have found that certain health care needs can be provided without the need for a physical exam.  This service lets us provide the care you need with a short phone conversation.  If a prescription is necessary we can send it directly to your pharmacy.  If lab work is needed we can place an order for that and you can then stop by our lab to have the test done at a later time.    If during the course of the call the physician/provider feels a telephone visit is not appropriate, you will not be charged for this service.\"     May Arrieta complains of    Chief Complaint   Patient presents with     Sinus Problem     Mouth Problem       I have reviewed and updated the patient's Past Medical History, Social History, Family History and Medication List.    ALLERGIES  Albuterol; Doxycycline; Penicillins; and Pollen extract    Sinus issues       Duration: 3 days     Description  nasal congestion, rhinorrhea, facial pain/pressure, frontal headache, hoarse voice and conjunctival irritation    Severity: moderate    Accompanying signs and symptoms: Tongue has metallic taste in mouth, slight white coating on tongue. Patient states she has previously had symptoms felt similar      History (predisposing factors):  Works in hospital     Precipitating or alleviating factors: None    Therapies tried and outcome:  Tylenol, allergy sinus tablets, flonase- minimal relief        Additional provider notes: Patient states that she has had some nasal congestion some facial pressure along with a frontal headache that is been ongoing for about 3 to 4 days.  She has been treated in the past for sinus infections.  She is intolerant to penicillin and doxycycline.  She states that she has not been on " any recent antibiotics but does work in a hospital situation.  She reports some nonspecific sensation in her mouth and a slight coating on her tongue and wonders what that could be.    Assessment/Plan:    1. Acute sinusitis with symptoms  I discussed with the patient other symptoms appear to be consistent potentially with sinusitis although via a virtual visit it is very difficult to clinically assess this fully.  I discussed the options with her which included continuing with her nasal inhaler and obtaining a nasal irrigation pot through her pharmacy and then empirically treating her with antibiotics.  I discussed the fact that since she is penicillin intolerant and doxycycline intolerant that the next best option would probably be Levaquin 500 mg tablets for 7 days.  I discussed with her the risks of the medication.  I informed her that at the present time I see no clinical reason why she should have thrush but that it is difficult to assess the changes that she is feeling in her tongue without actually seeing her here in the clinic.  She is comfortable observing the symptoms at present time but was advised if they worsen that she should be seen in clinic for further assessment.  I discussed with the patient that taking antibiotics can sometimes increase the risk for thrush and thus she needs to watch this closely and if the symptoms worsen or become more problematic she should let us know.  - levofloxacin (LEVAQUIN) 500 MG tablet; Take 1 tablet (500 mg) by mouth daily  Dispense: 7 tablet; Refill: 0    Phone call duration:  15 minutes    Spike Pederson MD

## 2020-04-14 ENCOUNTER — TELEPHONE (OUTPATIENT)
Dept: INTERNAL MEDICINE | Facility: CLINIC | Age: 45
End: 2020-04-14

## 2020-04-14 ENCOUNTER — OFFICE VISIT (OUTPATIENT)
Dept: URGENT CARE | Facility: URGENT CARE | Age: 45
End: 2020-04-14
Payer: COMMERCIAL

## 2020-04-14 VITALS
OXYGEN SATURATION: 100 % | RESPIRATION RATE: 20 BRPM | DIASTOLIC BLOOD PRESSURE: 79 MMHG | TEMPERATURE: 98.8 F | HEIGHT: 67 IN | SYSTOLIC BLOOD PRESSURE: 118 MMHG | WEIGHT: 200 LBS | BODY MASS INDEX: 31.39 KG/M2

## 2020-04-14 DIAGNOSIS — Z72.0 TOBACCO ABUSE: ICD-10-CM

## 2020-04-14 DIAGNOSIS — J30.2 SEASONAL ALLERGIC RHINITIS, UNSPECIFIED TRIGGER: ICD-10-CM

## 2020-04-14 DIAGNOSIS — J01.90 ACUTE SINUSITIS WITH SYMPTOMS > 10 DAYS: Primary | ICD-10-CM

## 2020-04-14 PROCEDURE — 99213 OFFICE O/P EST LOW 20 MIN: CPT | Performed by: FAMILY MEDICINE

## 2020-04-14 RX ORDER — FLUTICASONE PROPIONATE 50 MCG
1 SPRAY, SUSPENSION (ML) NASAL DAILY
Qty: 15.8 ML | Refills: 0 | Status: SHIPPED | OUTPATIENT
Start: 2020-04-14 | End: 2020-05-14

## 2020-04-14 RX ORDER — CEFDINIR 300 MG/1
300 CAPSULE ORAL 2 TIMES DAILY
Qty: 20 CAPSULE | Refills: 0 | Status: SHIPPED | OUTPATIENT
Start: 2020-04-14 | End: 2020-04-24

## 2020-04-14 ASSESSMENT — MIFFLIN-ST. JEOR: SCORE: 1594.82

## 2020-04-14 NOTE — TELEPHONE ENCOUNTER
Call from patient today.     States that sinus infection has not gone away from her virtual visit that was completed 3/24/2020.     States that her head feels so full and she has a terrible headache.     Previous symptoms 3/24/2020: nasal congestion, rhinorrhea, facial pain/pressure, frontal headache, hoarse voice and conjunctival irritation. Tongue swelling per patient    Current: States that all her previous symptoms are still present.  Also states she has heart burn, feels likes a ball in her throat she eats/swallows, head feels full. Improvement: tongue no longer swollen, tingly like it was before.     Made an emergency dentist appointment because she thought it may be dental. Does not know if its dentist related, but does not want to go to an expensive appointment if its not. Sees her dentist twice a year and has never had any concerns.     States been taking 10-12 Tylenol a day. 500mg (Talked to patient about taking the correct dose of medication and should not take more than 8, 500mg tabs a day)  Allergy pill in the AM.   Sometimes she adds an Advil.   States she takes a lot of heartburn pills too ( unknown how many)    Would like to know: 1. Does PCP think it is okay to cancel her dentist appointment at 1000 and her symptoms might be sinus related and not tooth related. 2. Should she complete a follow up visit with PCP? Does not know if she wants to do another phone visit, as her symptoms could not be assessed well over the phone.

## 2020-04-14 NOTE — TELEPHONE ENCOUNTER
The decision on whether or not she needs to go to her dental appointment should be addressed by her and her dentist.  If her symptoms are not improving or worsening despite her recent abx therapy and it is not a dental issue than she probably should be seen in the clinic for a face-to-face visit.

## 2020-04-14 NOTE — TELEPHONE ENCOUNTER
Patient was called back.     Provider message shared with patient.     States that she just finished up a visit with  [facility] and was given a different ABX at this time.     Will follow up with clinic if needed if symptoms continue, but no further follow up needed at this time.

## 2020-04-14 NOTE — PROGRESS NOTES
SUBJECTIVE: May Arrieta is a 44 year old female here with concerns about sinus infection.  She states onset  of symptoms were greater than 10 days with sinus pressure and drainage.  Course of illness is worsening.    Severity: moderate  Current and Associated symptoms: cold symptoms  Predisposing factors include none.   Recent treatment has included: OTC's  Allergic symptoms include: none    Past Medical History:   Diagnosis Date     Allergic rhinitis, cause unspecified      Back pain     last 16 years     Bipolar I disorder, most recent episode (or current) unspecified      Diabetes mellitus of mother, complicating pregnancy, childbirth, or the puerperium, unspecified as to episode of care(648.00)      Diabetes mellitus of mother, complicating pregnancy, childbirth, or the puerperium, unspecified as to episode of care(648.00)      Hyperlipidemia LDL goal <160 10/31/2010     Hysteroscopic sterilization by [facility]--3/30/10 3/30/2010     Lumbago 2/1/2010     Ovarian cyst 2/3/2009     Ovarian cyst      Pain in joint, pelvic region and thigh 2/1/2010     Tobacco use disorder      Allergies   Allergen Reactions     Albuterol Other (See Comments)     Felt hot and vomited - update: pt has used this med after her reaction listed and did not have any negative reaction     Doxycycline Nausea     Penicillins Nausea and Vomiting and Other (See Comments)     Headache, fever     Pollen Extract      Other reaction(s): Itching  Headache     Social History     Tobacco Use     Smoking status: Current Every Day Smoker     Packs/day: 0.50     Years: 15.00     Pack years: 7.50     Types: Cigarettes     Smokeless tobacco: Never Used     Tobacco comment: 1/2 PPD   Substance Use Topics     Alcohol use: Yes     Comment: 10 per month       ROS:   no rash  no vomiting    OBJECTIVE:  /79 (BP Location: Right arm, Patient Position: Sitting, Cuff Size: Adult Regular)   Temp 98.8  F (37.1  C) (Tympanic)   Resp 20   Ht 1.702 m (5'  "7\")   Wt 90.7 kg (200 lb)   SpO2 100%   BMI 31.32 kg/m    NAD  EYES: clear, no mattering  EARS: TM's clear, no redness/buldging, canals clear, no swelling/redness  NOSE: discolored discharge chi. Nares  THROAT: clear, no erythema, no exudate  SINUS: maxillary tenderness with palpation  LUNGS: CTAB, no rhonchi/wheeze/rales      ICD-10-CM    1. Acute sinusitis with symptoms > 10 days  J01.90 cefdinir (OMNICEF) 300 MG capsule     fluticasone (FLONASE) 50 MCG/ACT nasal spray   2. Tobacco abuse  Z72.0    3. Seasonal allergic rhinitis, unspecified trigger  J30.2 fluticasone (FLONASE) 50 MCG/ACT nasal spray     Quit tobacco  Follow up with primary clinic if not improving    "

## 2020-04-18 ENCOUNTER — OFFICE VISIT (OUTPATIENT)
Dept: URGENT CARE | Facility: URGENT CARE | Age: 45
End: 2020-04-18
Payer: COMMERCIAL

## 2020-04-18 VITALS
BODY MASS INDEX: 31.32 KG/M2 | SYSTOLIC BLOOD PRESSURE: 129 MMHG | DIASTOLIC BLOOD PRESSURE: 83 MMHG | HEART RATE: 96 BPM | OXYGEN SATURATION: 98 % | WEIGHT: 200 LBS | TEMPERATURE: 97.8 F

## 2020-04-18 DIAGNOSIS — B37.0 THRUSH: Primary | ICD-10-CM

## 2020-04-18 DIAGNOSIS — J01.90 ACUTE SINUSITIS WITH SYMPTOMS > 10 DAYS: ICD-10-CM

## 2020-04-18 PROCEDURE — 99214 OFFICE O/P EST MOD 30 MIN: CPT | Performed by: FAMILY MEDICINE

## 2020-04-18 RX ORDER — AZITHROMYCIN 250 MG/1
TABLET, FILM COATED ORAL
Qty: 6 TABLET | Refills: 0 | Status: SHIPPED | OUTPATIENT
Start: 2020-04-18 | End: 2020-04-23

## 2020-04-18 RX ORDER — CLOTRIMAZOLE 10 MG/1
10 LOZENGE ORAL
Qty: 50 TROCHE | Refills: 0 | Status: SHIPPED | OUTPATIENT
Start: 2020-04-18 | End: 2020-04-28

## 2020-04-18 NOTE — PROGRESS NOTES
SUBJECTIVE: May Arrieta is a 44 year old female here with concerns about sinus infection.  She states onset  of symptoms were greater than 10 days with sinus pressure and drainage.  Course of illness is worsening.    Severity: moderate  Current and Associated symptoms: cold symptoms  Predisposing factors include none.   Recent treatment has included: OTC's  Allergic symptoms include: none  Also bilateral gum soreness    Past Medical History:   Diagnosis Date     Allergic rhinitis, cause unspecified      Back pain     last 16 years     Bipolar I disorder, most recent episode (or current) unspecified      Diabetes mellitus of mother, complicating pregnancy, childbirth, or the puerperium, unspecified as to episode of care(648.00)      Diabetes mellitus of mother, complicating pregnancy, childbirth, or the puerperium, unspecified as to episode of care(648.00)      Hyperlipidemia LDL goal <160 10/31/2010     Hysteroscopic sterilization by [facility]--3/30/10 3/30/2010     Lumbago 2/1/2010     Ovarian cyst 2/3/2009     Ovarian cyst      Pain in joint, pelvic region and thigh 2/1/2010     Tobacco use disorder      Allergies   Allergen Reactions     Albuterol Other (See Comments)     Felt hot and vomited - update: pt has used this med after her reaction listed and did not have any negative reaction     Doxycycline Nausea     Penicillins Nausea and Vomiting and Other (See Comments)     Headache, fever     Pollen Extract      Other reaction(s): Itching  Headache     Social History     Tobacco Use     Smoking status: Current Every Day Smoker     Packs/day: 0.50     Years: 15.00     Pack years: 7.50     Types: Cigarettes     Smokeless tobacco: Never Used     Tobacco comment: 1/2 PPD   Substance Use Topics     Alcohol use: Yes     Comment: 10 per month       ROS:   no rash  no vomiting    OBJECTIVE:  /83 (BP Location: Right arm, Patient Position: Chair, Cuff Size: Adult Regular)   Pulse 96   Temp 97.8  F (36.6  C)  (Oral)   Wt 90.7 kg (200 lb)   SpO2 98%   BMI 31.32 kg/m    NAD  EYES: clear, no mattering  EARS: TM's clear, no redness/buldging, canals clear, no swelling/redness  NOSE: discolored discharge chi. Nares  THROAT: clear, no erythema, no exudate, white patches lower rgums  SINUS: maxillary tenderness with palpation  LUNGS: CTAB, no rhonchi/wheeze/rales      ICD-10-CM    1. Thrush  B37.0 clotrimazole 10 MG nahum   2. Acute sinusitis with symptoms > 10 days  J01.90 azithromycin (ZITHROMAX) 250 MG tablet     Discontinue Omnicef  Follow up with primary clinic if not improving

## 2020-04-27 ENCOUNTER — TRANSFERRED RECORDS (OUTPATIENT)
Dept: HEALTH INFORMATION MANAGEMENT | Facility: CLINIC | Age: 45
End: 2020-04-27

## 2020-05-01 ENCOUNTER — OFFICE VISIT (OUTPATIENT)
Dept: URGENT CARE | Facility: URGENT CARE | Age: 45
End: 2020-05-01
Payer: COMMERCIAL

## 2020-05-01 VITALS
TEMPERATURE: 98.3 F | SYSTOLIC BLOOD PRESSURE: 135 MMHG | HEART RATE: 117 BPM | BODY MASS INDEX: 31.32 KG/M2 | OXYGEN SATURATION: 99 % | DIASTOLIC BLOOD PRESSURE: 90 MMHG | RESPIRATION RATE: 18 BRPM | WEIGHT: 200 LBS

## 2020-05-01 DIAGNOSIS — G44.209 ACUTE NON INTRACTABLE TENSION-TYPE HEADACHE: Primary | ICD-10-CM

## 2020-05-01 LAB
ANION GAP SERPL CALCULATED.3IONS-SCNC: 5 MMOL/L (ref 3–14)
BASOPHILS # BLD AUTO: 0 10E9/L (ref 0–0.2)
BASOPHILS NFR BLD AUTO: 0.2 %
BUN SERPL-MCNC: 11 MG/DL (ref 7–30)
CALCIUM SERPL-MCNC: 8.9 MG/DL (ref 8.5–10.1)
CHLORIDE SERPL-SCNC: 108 MMOL/L (ref 94–109)
CO2 SERPL-SCNC: 26 MMOL/L (ref 20–32)
CREAT SERPL-MCNC: 0.84 MG/DL (ref 0.52–1.04)
DIFFERENTIAL METHOD BLD: ABNORMAL
EOSINOPHIL # BLD AUTO: 0.1 10E9/L (ref 0–0.7)
EOSINOPHIL NFR BLD AUTO: 1.2 %
ERYTHROCYTE [DISTWIDTH] IN BLOOD BY AUTOMATED COUNT: 12.8 % (ref 10–15)
GFR SERPL CREATININE-BSD FRML MDRD: 85 ML/MIN/{1.73_M2}
GLUCOSE SERPL-MCNC: 93 MG/DL (ref 70–99)
HCT VFR BLD AUTO: 43.5 % (ref 35–47)
HGB BLD-MCNC: 14.3 G/DL (ref 11.7–15.7)
LYMPHOCYTES # BLD AUTO: 3 10E9/L (ref 0.8–5.3)
LYMPHOCYTES NFR BLD AUTO: 26.7 %
MCH RBC QN AUTO: 32.2 PG (ref 26.5–33)
MCHC RBC AUTO-ENTMCNC: 32.9 G/DL (ref 31.5–36.5)
MCV RBC AUTO: 98 FL (ref 78–100)
MONOCYTES # BLD AUTO: 1 10E9/L (ref 0–1.3)
MONOCYTES NFR BLD AUTO: 8.7 %
NEUTROPHILS # BLD AUTO: 7.1 10E9/L (ref 1.6–8.3)
NEUTROPHILS NFR BLD AUTO: 63.2 %
PLATELET # BLD AUTO: 303 10E9/L (ref 150–450)
POTASSIUM SERPL-SCNC: 4.3 MMOL/L (ref 3.4–5.3)
RBC # BLD AUTO: 4.44 10E12/L (ref 3.8–5.2)
SODIUM SERPL-SCNC: 139 MMOL/L (ref 133–144)
WBC # BLD AUTO: 11.3 10E9/L (ref 4–11)

## 2020-05-01 PROCEDURE — 99214 OFFICE O/P EST MOD 30 MIN: CPT | Mod: 25 | Performed by: PHYSICIAN ASSISTANT

## 2020-05-01 PROCEDURE — 96372 THER/PROPH/DIAG INJ SC/IM: CPT | Performed by: PHYSICIAN ASSISTANT

## 2020-05-01 PROCEDURE — 36415 COLL VENOUS BLD VENIPUNCTURE: CPT | Performed by: PHYSICIAN ASSISTANT

## 2020-05-01 PROCEDURE — 80048 BASIC METABOLIC PNL TOTAL CA: CPT | Performed by: PHYSICIAN ASSISTANT

## 2020-05-01 PROCEDURE — 85025 COMPLETE CBC W/AUTO DIFF WBC: CPT | Performed by: PHYSICIAN ASSISTANT

## 2020-05-01 RX ORDER — KETOROLAC TROMETHAMINE 10 MG/1
10 TABLET, FILM COATED ORAL EVERY 6 HOURS PRN
Qty: 30 TABLET | Refills: 0 | Status: SHIPPED | OUTPATIENT
Start: 2020-05-01 | End: 2020-05-11

## 2020-05-01 RX ORDER — KETOROLAC TROMETHAMINE 30 MG/ML
30 INJECTION, SOLUTION INTRAMUSCULAR; INTRAVENOUS ONCE
Status: COMPLETED | OUTPATIENT
Start: 2020-05-01 | End: 2020-05-01

## 2020-05-01 RX ORDER — HYDROCODONE BITARTRATE AND ACETAMINOPHEN 5; 325 MG/1; MG/1
1 TABLET ORAL EVERY 6 HOURS PRN
Qty: 4 TABLET | Refills: 0 | Status: SHIPPED | OUTPATIENT
Start: 2020-05-01 | End: 2020-05-03

## 2020-05-01 RX ADMIN — KETOROLAC TROMETHAMINE 30 MG: 30 INJECTION, SOLUTION INTRAMUSCULAR; INTRAVENOUS at 12:17

## 2020-05-01 NOTE — PROGRESS NOTES
Clinic Administered Medication Documentation      Injectable Medication Documentation    Patient was given Ketorolac Tromethamine (Toradol). Prior to medication administration, verified patients identity using patient s name and date of birth. Please see MAR and medication order for additional information. Patient instructed to remain in clinic for 15 minutes.      Was entire vial of medication used? Yes  Vial/Syringe: Single dose vial  Expiration Date:  12/2021  Was this medication supplied by the patient? No

## 2020-05-01 NOTE — PROGRESS NOTES
URGENT CARE VISIT:    SUBJECTIVE:   May Arrieta is a 44 year old female who comes in for evaluation of headache.  Headache began 1month(s)} ago.  DESCRIPTION OF HEADACHE:   Location of pain: temporal   Character of pain:throbbing   Timing and onset: gradual onset and constant  Radiation of pain: NO   Severity of pain: severe   Accompanying symptoms: face pain and burning mouth   Negative for: dizziness, focal weakness and sensory deficits  Denies increased stress.  Patient rates pain as a 7 out of 10.   Pain gets worse as the day goes on.   Has been treated with 3 different antibiotics for presumed sinus infection which did not help.   Has seen ENT and dentist. They found nothing wrong. Had appointment with neurology today but when she went offices were closed. She rescheduled to May 12th.    History of Migranes: No   Are most headaches similar in presentation: NO:    TYPICAL PRECIPITANTS OF HEADACHE: none    CURRENT USE OF MEDS TO TREAT HA:   Abortive meds: none    Daily use: YES: Naproxen and Tylenol with no relief   Prophylactic meds: none    PMH:   Past Medical History:   Diagnosis Date     Allergic rhinitis, cause unspecified      Back pain     last 16 years     Bipolar I disorder, most recent episode (or current) unspecified      Diabetes mellitus of mother, complicating pregnancy, childbirth, or the puerperium, unspecified as to episode of care(648.00)      Diabetes mellitus of mother, complicating pregnancy, childbirth, or the puerperium, unspecified as to episode of care(648.00)      Hyperlipidemia LDL goal <160 10/31/2010     Hysteroscopic sterilization by [facility]--3/30/10 3/30/2010     Lumbago 2/1/2010     Ovarian cyst 2/3/2009     Ovarian cyst      Pain in joint, pelvic region and thigh 2/1/2010     Tobacco use disorder      Allergies: Albuterol; Doxycycline; Penicillins; and Pollen extract   Medications:   Current Outpatient Medications   Medication Sig Dispense Refill     fluticasone (FLONASE) 50  MCG/ACT nasal spray Spray 1 spray into both nostrils daily 15.8 mL 0     fluticasone (FLONASE) 50 MCG/ACT spray Spray 1-2 sprays into both nostrils daily 1 Bottle 11     HYDROcodone-acetaminophen (NORCO) 5-325 MG tablet Take 1 tablet by mouth every 6 hours as needed for pain 4 tablet 0     ibuprofen (ADVIL/MOTRIN) 600 MG tablet Take 1 tablet (600 mg) by mouth every 6 hours as needed for moderate pain 30 tablet 0     ketorolac (TORADOL) 10 MG tablet Take 1 tablet (10 mg) by mouth every 6 hours as needed for moderate pain 30 tablet 0     magic mouthwash (ENTER INGREDIENTS IN COMMENTS) suspension Take 5 mLs by mouth every 4 hours as needed (mouth pain) 100 mL 0     norethindrone (MICRONOR) 0.35 MG tablet Take 1 tablet (0.35 mg) by mouth daily 84 tablet 3     cetirizine (ZYRTEC) 10 MG tablet Take 1 tablet (10 mg) by mouth every evening (Patient not taking: Reported on 4/18/2020) 30 tablet 3     Social History:   Social History     Tobacco Use     Smoking status: Current Every Day Smoker     Packs/day: 0.50     Years: 15.00     Pack years: 7.50     Types: Cigarettes     Smokeless tobacco: Never Used     Tobacco comment: 1/2 PPD   Substance Use Topics     Alcohol use: Yes     Comment: 10 per month       ROS:  Review of systems negative except as stated above.    OBJECTIVE:  BP (!) 135/90   Pulse 117   Temp 98.3  F (36.8  C) (Oral)   Resp 18   Wt 90.7 kg (200 lb)   SpO2 99%   BMI 31.32 kg/m    GENERAL APPEARANCE: healthy, alert and no distress  EYES: EOMI,  PERRL, conjunctiva clear  HENT: ear canals and TM's normal.  Nose and mouth without ulcers, erythema or lesions  NECK: supple, nontender, no lymphadenopathy  RESP: lungs clear to auscultation - no rales, rhonchi or wheezes  CV: regular rates and rhythm, normal S1 S2, no murmur noted  ABDOMEN:  soft, nontender, no HSM or masses and bowel sounds normal  NEURO: Normal strength and tone in all four extremities, speech normal, cranial nerves 2-12 intact, Romberg  negative, finger to nose and rapid alternating movements test normal, sensitive to light tough throughout   SKIN: no suspicious lesions or rashes  Psych: crying dad     ASSESSMENT:    ICD-10-CM    1. Acute non intractable tension-type headache  G44.209 ketorolac (TORADOL) injection 30 mg     INJECTION INTRAMUSCULAR OR SUB-Q     CBC with platelets and differential     Basic metabolic panel  (Ca, Cl, CO2, Creat, Gluc, K, Na, BUN)     HYDROcodone-acetaminophen (NORCO) 5-325 MG tablet     ketorolac (TORADOL) 10 MG tablet     magic mouthwash (ENTER INGREDIENTS IN COMMENTS) suspension       PLAN:  Patient Instructions   No known cause of headache. Has seen ENT and dentist without any diagnosis. Scheduled for Neurology on May 12th. Recommend neurology consult which is set for May 12th. Toradol did not improve her pain. Conservative measures discussed including rest, and Tylenol. Take medications as prescribed. Side effects may include drowsiness. Keep a headache journal to identify potential triggers such as certain foods or stress. See your primary care provider if symptoms do not improve in 3 days. Seek emergency care if you develop severe headache, stiff neck, vision changes, loss of consciousness, dizziness, or confusion. Patient verbalized understanding and is agreeable to plan. The patient was discharged ambulatory and in stable condition.    Rafia Timmons PA-C ....................  5/1/2020   2:06 PM

## 2020-05-01 NOTE — PATIENT INSTRUCTIONS
No known cause of headache. Recommend neurology consult which is set for May 12th. Conservative measures discussed including rest, and Tylenol. Take medications as prescribed. Side effects may include drowsiness. Keep a headache journal to identify potential triggers such as certain foods or stress. See your primary care provider if symptoms do not improve in 3 days. Seek emergency care if you develop severe headache, stiff neck, vision changes, loss of consciousness, dizziness, or confusion.

## 2020-05-08 ENCOUNTER — TELEPHONE (OUTPATIENT)
Dept: INTERNAL MEDICINE | Facility: CLINIC | Age: 45
End: 2020-05-08

## 2020-05-08 NOTE — TELEPHONE ENCOUNTER
Please assist patient with scheduling a visit to discuss referral needs per provider note below.    Thank you

## 2020-05-08 NOTE — TELEPHONE ENCOUNTER
Patient not seen for quite sometime and needs phone appt. To verify needs for referral.  Also cannot give referral to peridontist as this is a dental issue.

## 2020-05-08 NOTE — TELEPHONE ENCOUNTER
Patient calling,     Wondering if provider can referral her to ENT and Neurology, and Periodontist.    Mouth is on fire- did complete Thrush treatment. Feels like she has a metal taste in her mouth. Feels like head is heavy with a continued headache. Was seen by ENT back in April- but self referred; Insurance cost is an issue without a referral. Toradol injection did not help in UC. Patient reports she is not sure what else to do. Headaches are the same but can worsen during the day. She is still trying Aleve, cold pack, orajel, and mouth guard, and Tylenol with mild relief.     Overall she is wondering if PCP specialist would help identify her ongoing headache and mouth concerns.      Evangelina LAYN, RN, PHN

## 2020-08-25 ENCOUNTER — OFFICE VISIT (OUTPATIENT)
Dept: OBGYN | Facility: CLINIC | Age: 45
End: 2020-08-25
Payer: COMMERCIAL

## 2020-08-25 VITALS
DIASTOLIC BLOOD PRESSURE: 78 MMHG | BODY MASS INDEX: 31.55 KG/M2 | HEART RATE: 104 BPM | HEIGHT: 67 IN | WEIGHT: 201 LBS | SYSTOLIC BLOOD PRESSURE: 130 MMHG

## 2020-08-25 DIAGNOSIS — N93.9 ABNORMAL UTERINE BLEEDING (AUB): Primary | ICD-10-CM

## 2020-08-25 DIAGNOSIS — N94.10 FEMALE DYSPAREUNIA: ICD-10-CM

## 2020-08-25 PROCEDURE — 88305 TISSUE EXAM BY PATHOLOGIST: CPT | Performed by: OBSTETRICS & GYNECOLOGY

## 2020-08-25 PROCEDURE — 99213 OFFICE O/P EST LOW 20 MIN: CPT | Mod: 25 | Performed by: OBSTETRICS & GYNECOLOGY

## 2020-08-25 PROCEDURE — 58100 BIOPSY OF UTERUS LINING: CPT | Performed by: OBSTETRICS & GYNECOLOGY

## 2020-08-25 RX ORDER — AMITRIPTYLINE HYDROCHLORIDE 10 MG/1
TABLET ORAL EVERY 24 HOURS
COMMUNITY
Start: 2020-07-15 | End: 2021-06-17

## 2020-08-25 RX ORDER — GABAPENTIN 100 MG/1
CAPSULE ORAL
COMMUNITY
Start: 2020-08-12 | End: 2021-06-17

## 2020-08-25 ASSESSMENT — MIFFLIN-ST. JEOR: SCORE: 1594.36

## 2020-08-25 NOTE — PATIENT INSTRUCTIONS
Patient Education     Types of Laparoscopic Hysterectomy  There are several types of laparoscopic hysterectomy. Depending on your needs, all or part of the uterus may be removed. In some cases, the cervix, ovaries, or fallopian tubes are also removed. Your surgeon will discuss the options with you before surgery.    Total hysterectomy  A total hysterectomy means that the entire uterus is removed. It may be removed through the vagina. Or, it may be removed in pieces through small incisions in the abdomen.    Hysterectomy with removal of ovaries  In this procedure, the uterus, ovaries, and fallopian tubes are removed. The organs may be removed through the vagina or in pieces through small incisions in the abdomen.    Laparoscopic supracervical hysterectomy (LSH)  With this procedure, the top portion of the uterus is removed. The cervix is left in place and may be closed at top. This procedure may be done if the cervix is healthy. The uterus is removed in pieces through small incisions in the abdomen. The ovaries and tubes may be removed during this type of hysterectomy if desired.  Date Last Reviewed: 3/1/2017    [id number] [employer] [address] [place] [address], [place] [address]. All rights reserved. This information is not intended as a substitute for professional medical care. Always follow your healthcare professional's instructions.

## 2020-08-25 NOTE — PROGRESS NOTES
"  SUBJECTIVE:                                                   May Arrieta is a 45 year old female who presents to clinic today for the following health issue(s):  Patient presents with:  Pelvic Pain: Patient states she's had pelvic pain for past 12 years. Patient has a hx Cyst.   Abnormal Uterine Bleeding: From the week of 2020 to around the first week of August. Patient had constant dripping of bright red blood.       HPI:  May presents again for persistent pelvic pain that has not improved since her last visit. She was recommended to have the Essure removed and to have a bilateral salpingectomy but she never followed up. She was placed on micronor for menses controlled due to her smoking. She tried this method for three months with no change.   Earlier this month she had a two week period of continuous vaginal bleeding when she would sit on the toilet. She states that the blood would \"drip\" into the toilet bowl. She has also continued to have pain with intercourse with deep penetration. She is not sure if she would like definitive surgical management yet.    Patient's last menstrual period was 2020..     Patient is sexually active, .  Using Essure for contraception.    reports that she has been smoking cigarettes. She has a 7.50 pack-year smoking history. She has never used smokeless tobacco.  Tobacco Cessation Action Plan: Information offered: Patient not interested at this time  STD testing offered?  Declined    Health maintenance updated:  no, due for mammogram    Today's PHQ-2 Score:   PHQ-2 (  [employer]) 3/24/2020   Q1: Little interest or pleasure in doing things 0   Q2: Feeling down, depressed or hopeless 0   PHQ-2 Score 0   Q1: Little interest or pleasure in doing things -   Q2: Feeling down, depressed or hopeless -   PHQ-2 Score -     Today's PHQ-9 Score:   PHQ-9 SCORE 2019   PHQ-9 Total Score 0     Today's NADYA-7 Score:   NADYA-7 SCORE 2019   Total Score 13 "       Problem list and histories reviewed & adjusted, as indicated.  Additional history: as documented.    Patient Active Problem List   Diagnosis     Bipolar I disorder, most recent episode (or current) unspecified     Tobacco use disorder     Allergic rhinitis     Ovarian cyst     Lumbago     Pain in joint, pelvic region and thigh     Status post tubal ligation     HYPERLIPIDEMIA LDL GOAL <160     Fear of flying     History of diabetes mellitus arising in pregnancy     Past Surgical History:   Procedure Laterality Date     C NONSPECIFIC PROCEDURE      Cosmetic Surgery on Ears     HC HYSTEROS W PERMANENT FALLOPAIN IMPLANT  3/2010    documented tubal occlusion by HSG on 7/21/10     LAPAROSCOPIC CYSTECTOMY OVARIAN (BENIGN)  5/12/2013    Procedure: LAPAROSCOPIC CYSTECTOMY OVARIAN (BENIGN);  Diagnostic laparoscopy, left ovarian cystectomy ;  Surgeon: Ignacia Madrid DO;  Location:  OR     LAPAROSCOPY DIAGNOSTIC (GYN)  5/12/2013    Procedure: LAPAROSCOPY DIAGNOSTIC (GYN);;  Surgeon: Ignacia Madrid DO;  Location:  OR      Social History     Tobacco Use     Smoking status: Current Every Day Smoker     Packs/day: 0.50     Years: 15.00     Pack years: 7.50     Types: Cigarettes     Smokeless tobacco: Never Used     Tobacco comment: 1/2 PPD   Substance Use Topics     Alcohol use: Yes     Comment: 10 per month      Problem (# of Occurrences) Relation (Name,Age of Onset)    Allergies (2) Daughter, Son    Breast Cancer (1) Paternal Grandmother    C.A.D. (2) Paternal Grandfather: mi in his 50s, Maternal Grandfather: MI in his 50s    Cancer (1) Maternal Grandmother: colon    Depression (1) Mother    Diabetes (3) Mother, Paternal Grandmother, Paternal Grandfather    Hypertension (1) Mother    Thyroid Disease (1) Daughter: asthma            Current Outpatient Medications   Medication Sig     amitriptyline (ELAVIL) 10 MG tablet every 24 hours     cetirizine (ZYRTEC) 10 MG tablet Take 1 tablet (10 mg) by mouth every  "evening     fluticasone (FLONASE) 50 MCG/ACT spray Spray 1-2 sprays into both nostrils daily     gabapentin (NEURONTIN) 100 MG capsule Take 1-3 pills up to 3 times daily     ibuprofen (ADVIL/MOTRIN) 600 MG tablet Take 1 tablet (600 mg) by mouth every 6 hours as needed for moderate pain     norethindrone (MICRONOR) 0.35 MG tablet Take 1 tablet (0.35 mg) by mouth daily     omeprazole (PRILOSEC) 20 MG DR capsule omeprazole 20 mg capsule,delayed release     No current facility-administered medications for this visit.      Allergies   Allergen Reactions     Albuterol Other (See Comments)     Felt hot and vomited - update: pt has used this med after her reaction listed and did not have any negative reaction     Doxycycline Nausea     Penicillins Nausea and Vomiting and Other (See Comments)     Headache, fever     Pollen Extract      Other reaction(s): Itching  Headache       ROS:  12 point review of systems negative other than symptoms noted below or in the HPI.  Genitourinary: Pelvic Pain and Spotting  No urinary frequency or dysuria, bladder or kidney problems      OBJECTIVE:     /78 (BP Location: Right arm, Patient Position: Sitting, Cuff Size: Adult Large)   Pulse 104   Ht 1.702 m (5' 7\")   Wt 91.2 kg (201 lb)   LMP 07/26/2020   BMI 31.48 kg/m    Body mass index is 31.48 kg/m .    Exam:  Constitutional:  Appearance: Well nourished, well developed alert, in no acute distress  Gastrointestinal:  Abdominal Examination:  Abdomen nontender to palpation, tone normal without rigidity or guarding, no masses present, umbilicus without lesions; Liver/Spleen:  No hepatomegaly present, liver nontender to palpation; Hernias:  No hernias present  Skin: General Inspection:  No rashes present, no lesions present, no areas of discoloration.  Neurologic:  Mental Status:  Oriented X3.  Normal strength and tone, sensory exam grossly normal, mentation intact and speech normal.    Psychiatric:  Mentation appears normal and " affect normal/bright.  Pelvic Exam:  External Genitalia:     Normal appearance for age, no discharge present, no tenderness present, no inflammatory lesions present, color normal  Vagina:     Normal vaginal vault without central or paravaginal defects, no discharge present, no inflammatory lesions present, no masses present  Bladder:     Nontender to palpation  Urethra:   Urethral Body:  Urethra palpation normal, urethra structural support normal   Urethral Meatus:  No erythema or lesions present  Cervix:     Appearance healthy, no lesions present, tender to palpation, no bleeding present  Uterus:     Uterus: firm, normal sized and tender, midplane in position.   Adnexa:     Bilateral adnexal tenderness present, no adnexal masses present  Perineum:     Perineum within normal limits, no evidence of trauma, no rashes or skin lesions present    Genitalia and Groin:     No rashes present, no lesions present, no areas of discoloration, no masses present       In-Clinic Test Results:  No results found for this or any previous visit (from the past 24 hour(s)).     INDICATIONS:                                                    Is a pregnancy test required: No.  Was a consent obtained?  Yes    Having endometrial biopsy for abnormal uterine bleeding    Today's PHQ-2 Score:   PHQ-2 ( 1999 [employer]) 3/24/2020   Q1: Little interest or pleasure in doing things 0   Q2: Feeling down, depressed or hopeless 0   PHQ-2 Score 0   Q1: Little interest or pleasure in doing things -   Q2: Feeling down, depressed or hopeless -   PHQ-2 Score -       PROCEDURE;                                                      A speculum was placed in the vagina and cervix prepped with betadine. A tenaculum was not attached to the cervix. A small plastic 5 mm Pipelle syringe curette was inserted into the cervical canal. The uterus was sounded to 8 cm's. A vigorous four quadrant biopsy was performed, removing amount moderate  of tissue. The speculum was  removed. This tissue was placed in Formalin and sent to pathology.    The patient tolerated the procedure  fairly well and she reported there was  cramping.      POST PROCEDURE;                                                      There  was no cramping at the time of discharge. She  tolerated the procedure well with minimal discomfort. There were no complications. Patient was discharged in stable condition.    Patient advised to call the clinic if severe pelvic pain, fever or heavy bleeding.    Palma Arrington MD    ASSESSMENT/PLAN:                                                        ICD-10-CM    1. Abnormal uterine bleeding (AUB)  N93.9 Surgical pathology exam     ENDOMETRIAL BIOPSY W/O CERVICAL DILATION   2. Female dyspareunia  N94.10      I advised May to quit smoking as this will give her more options for conservative management of her symptoms.  An endometrial biopsy was performed due to her abnormal uterine bleeding. She was counseled about getting a pelvic sonogram to evaluate her structures. I also recommend essure removal with a bilateral salpingectomy. She is hesitant to have this scheduled with Dr. Mckinney because she does not want to have a procedure that will not solve her problems. She is not ready to consider a supracervical or total hysterectomy at this time.     Palma Arrington MD  [facility] [place]

## 2020-08-27 LAB — COPATH REPORT: NORMAL

## 2020-09-07 ENCOUNTER — MYC REFILL (OUTPATIENT)
Dept: INTERNAL MEDICINE | Facility: CLINIC | Age: 45
End: 2020-09-07

## 2020-09-07 DIAGNOSIS — J31.0 CHRONIC RHINITIS: ICD-10-CM

## 2020-09-07 DIAGNOSIS — J31.0 CHRONIC RHINITIS, UNSPECIFIED TYPE: ICD-10-CM

## 2020-09-08 RX ORDER — CETIRIZINE HYDROCHLORIDE 10 MG/1
10 TABLET ORAL EVERY EVENING
Qty: 30 TABLET | Refills: 0 | Status: SHIPPED | OUTPATIENT
Start: 2020-09-08 | End: 2020-11-08

## 2020-09-08 RX ORDER — FLUTICASONE PROPIONATE 50 MCG
1-2 SPRAY, SUSPENSION (ML) NASAL DAILY
OUTPATIENT
Start: 2020-09-08

## 2020-09-08 NOTE — TELEPHONE ENCOUNTER
Flonase    Routing refill request to provider for review/approval because:  A break in medication- last refill sent in 2018    Evangelina LAYN, RN, PHN

## 2020-09-13 ENCOUNTER — MYC MEDICAL ADVICE (OUTPATIENT)
Dept: INTERNAL MEDICINE | Facility: CLINIC | Age: 45
End: 2020-09-13

## 2020-09-18 DIAGNOSIS — J31.0 CHRONIC RHINITIS: Primary | ICD-10-CM

## 2020-09-18 RX ORDER — FLUTICASONE PROPIONATE 50 MCG
1-2 SPRAY, SUSPENSION (ML) NASAL DAILY
Qty: 9.9 ML | Refills: 0 | Status: SHIPPED | OUTPATIENT
Start: 2020-09-18 | End: 2021-06-17

## 2020-09-18 NOTE — TELEPHONE ENCOUNTER
Not prescribed since 2018. Last prescribed by Minda ROBERTS who recently refused refill request since she has not seen patient since 2018, but patient has seen Dr. Pederson since then and Dr. Pederson is listed as PCP. Routing to him to review.

## 2020-11-08 ENCOUNTER — MYC REFILL (OUTPATIENT)
Dept: INTERNAL MEDICINE | Facility: CLINIC | Age: 45
End: 2020-11-08

## 2020-11-08 DIAGNOSIS — J31.0 CHRONIC RHINITIS: ICD-10-CM

## 2020-11-09 RX ORDER — CETIRIZINE HYDROCHLORIDE 10 MG/1
10 TABLET ORAL EVERY EVENING
Qty: 30 TABLET | Refills: 0 | Status: SHIPPED | OUTPATIENT
Start: 2020-11-09 | End: 2021-04-29

## 2021-01-15 ENCOUNTER — HEALTH MAINTENANCE LETTER (OUTPATIENT)
Age: 46
End: 2021-01-15

## 2021-04-29 ENCOUNTER — MYC REFILL (OUTPATIENT)
Dept: INTERNAL MEDICINE | Facility: CLINIC | Age: 46
End: 2021-04-29

## 2021-04-29 DIAGNOSIS — J31.0 CHRONIC RHINITIS: ICD-10-CM

## 2021-04-29 RX ORDER — CETIRIZINE HYDROCHLORIDE 10 MG/1
10 TABLET ORAL EVERY EVENING
Qty: 30 TABLET | Refills: 2 | Status: SHIPPED | OUTPATIENT
Start: 2021-04-29 | End: 2021-06-17

## 2021-04-29 NOTE — TELEPHONE ENCOUNTER
Medication is being filled for 1 time refill only due to:  Patient needs to be seen because it has been more than one year since last visit.     Prescription approved per [facility] Refill Protocol.    Evangelina STRICKLAND, RN, PHN

## 2021-06-01 NOTE — PROGRESS NOTES
May is a 45 year old  female who presents for annual exam.     Besides routine health maintenance, she would like to discuss excessive bleeding and clotting with periods for the past 6 months.    HPI:    May presents for an annual exam. She continues to have heavy menses. She continues to smoke. She is interested in Wellbutrin to help her quit smoking. Her menses continue to irregular about every 2-3 weeks with the passage of large clots. She developed burning mouth syndrome in the interim since her last visit. Those symptoms are improving however. She is not open to the idea of definitive treatment for abnormal uterine bleeding due to anovulation. She tried the progesterone only pill and did not like it. She has Essures in place and has had chronic pelvic pain from these. She is not ready to have these removed.    GYNECOLOGIC HISTORY:    No LMP recorded. (Menstrual status: Irregular Periods).  Essure  Regular menses? No, very heavy  Menses every 2-3 weeks.  Length of menses: 3-7 days  Her current contraception method is: Essure.  She  reports that she has been smoking cigarettes. She has a 7.50 pack-year smoking history. She has never used smokeless tobacco.  Tobacco Cessation Action Plan: patient has been working with a care giver, discussing medication  Patient is sexually active.  STD testing offered?  Declined     Last PHQ-9 score on record =   PHQ-9 SCORE 6/3/2021   PHQ-9 Total Score 5     Last GAD7 score on record =   NADYA-7 SCORE 6/3/2021   Total Score 5     Alcohol Score = 1    HEALTH MAINTENANCE:  Cholesterol:   Recent Labs   Lab Test 19  0937   CHOL 197   HDL 46*   *   TRIG 91     TSH   Date Value Ref Range Status   2019 1.51 0.40 - 4.00 mU/L Final     Last Mammo: 19, Result: Normal, Next Mammo:  Needs to schedule  Pap:   Lab Results   Component Value Date    PAP NIL, NEG-HPV 2016    PAP NIL 2011    PAP NIL 2010   Colonoscopy:  N/A, Result: not  applicable, Next Colonoscopy: screen age 50  Dexa:  N/A  Health maintenance updated:  Due for mammo    HISTORY:  OB History    Para Term  AB Living   2 2 2 0 0 2   SAB TAB Ectopic Multiple Live Births   0 0 0 0 2      # Outcome Date GA Lbr Shaun/2nd Weight Sex Delivery Anes PTL Lv   2 Term 05 39w0d  3.062 kg (6 lb 12 oz) F VACUUM   DILLON      Name: Ami      Apgar1: 8  Apgar5: 9   1 Term 94 40w0d 05:00 3.487 kg (7 lb 11 oz) M    DILLON      Name: FLORIN       Patient Active Problem List   Diagnosis     Bipolar I disorder, most recent episode (or current) unspecified     Tobacco use disorder     Allergic rhinitis     Ovarian cyst     Lumbago     Pain in joint, pelvic region and thigh     Status post tubal ligation     HYPERLIPIDEMIA LDL GOAL <160     Fear of flying     History of diabetes mellitus arising in pregnancy     Past Surgical History:   Procedure Laterality Date     HC HYSTEROS W PERMANENT FALLOPAIN IMPLANT  3/2010    documented tubal occlusion by HSG on 7/21/10     LAPAROSCOPIC CYSTECTOMY OVARIAN (BENIGN)  2013    Procedure: LAPAROSCOPIC CYSTECTOMY OVARIAN (BENIGN);  Diagnostic laparoscopy, left ovarian cystectomy ;  Surgeon: Ignacia Madrid DO;  Location:  OR     LAPAROSCOPY DIAGNOSTIC (GYN)  2013    Procedure: LAPAROSCOPY DIAGNOSTIC (GYN);;  Surgeon: Ignacia Madrid DO;  Location:  OR     [facility] NONSPECIFIC PROCEDURE      Cosmetic Surgery on Ears      Social History     Tobacco Use     Smoking status: Current Every Day Smoker     Packs/day: 0.50     Years: 15.00     Pack years: 7.50     Types: Cigarettes     Smokeless tobacco: Never Used     Tobacco comment: 1/2 PPD   Substance Use Topics     Alcohol use: Yes     Comment: 10 per month      Problem (# of Occurrences) Relation (Name,Age of Onset)    Allergies (2) Daughter, Son    Breast Cancer (1) Paternal Grandmother    C.A.D. (2) Paternal Grandfather: mi in his 50s, Maternal Grandfather: MI in his 50s     "Cancer (1) Maternal Grandmother: colon    Depression (1) Mother    Diabetes (3) Mother, Paternal Grandmother, Paternal Grandfather    Hypertension (1) Mother    Thyroid Disease (1) Daughter: asthma            Current Outpatient Medications   Medication Sig     amitriptyline (ELAVIL) 10 MG tablet every 24 hours     cetirizine (ZYRTEC) 10 MG tablet Take 1 tablet (10 mg) by mouth every evening     fluticasone (FLONASE) 50 MCG/ACT nasal spray Spray 1-2 sprays into both nostrils daily     gabapentin (NEURONTIN) 100 MG capsule Take 1-3 pills up to 3 times daily     MELATONIN PO      omeprazole (PRILOSEC) 20 MG DR capsule omeprazole 20 mg capsule,delayed release     norethindrone (MICRONOR) 0.35 MG tablet Take 1 tablet (0.35 mg) by mouth daily (Patient not taking: Reported on 6/3/2021)     No current facility-administered medications for this visit.      Allergies   Allergen Reactions     Albuterol Other (See Comments)     Felt hot and vomited - update: pt has used this med after her reaction listed and did not have any negative reaction     Doxycycline Nausea     Penicillins Nausea and Vomiting and Other (See Comments)     Headache, fever     Pollen Extract      Other reaction(s): Itching  Headache       Past medical, surgical, social and family histories were reviewed and updated in EPIC.    ROS:   12 point review of systems negative other than symptoms noted below or in the HPI.  Genitourinary: Heavy Bleeding with Period and Irregular Menses  No urinary frequency or dysuria, bladder or kidney problems    EXAM:  /78   Ht 1.715 m (5' 7.5\")   Wt 89.8 kg (198 lb)   BMI 30.55 kg/m     BMI: Body mass index is 30.55 kg/m .    PHYSICAL EXAM:  Constitutional:   Appearance: Well nourished, well developed, alert, in no acute distress  Neck:  Lymph Nodes:  No lymphadenopathy present    Thyroid:  Gland size normal, nontender, no nodules or masses present  on palpation  Chest:  Respiratory Effort:  Breathing " unlabored  Cardiovascular:    Heart: Auscultation:  Regular rate, normal rhythm, no murmurs present  Breasts: Inspection of Breasts:  No lymphadenopathy present., Palpation of Breasts and Axillae:  Left breast notable for palpable lump at the 6 o'clock position. Not palpable in the right breast. There is bilateral breast tenderness., Axillary Lymph Nodes:  No lymphadenopathy present. and No nodularity, asymmetry or nipple discharge bilaterally.  Gastrointestinal:   Abdominal Examination:  Abdomen nontender to palpation, tone normal without rigidity or guarding, no masses present, umbilicus without lesions   Liver and Spleen:  No hepatomegaly present, liver nontender to palpation    Hernias:  No hernias present  Lymphatic: Lymph Nodes:  No other lymphadenopathy present  Skin:  General Inspection:  No rashes present, no lesions present, no areas of discoloration  Neurologic:    Mental Status:  Oriented X3.  Normal strength and tone, sensory exam grossly normal, mentation intact and speech normal.    Psychiatric:   Mentation appears normal and affect normal/bright.         Pelvic Exam:  External Genitalia:     Normal appearance for age, no discharge present, no tenderness present, no inflammatory lesions present, color normal  Vagina:     Normal vaginal vault without central or paravaginal defects, no discharge present, no inflammatory lesions present, no masses present  Bladder:     Nontender to palpation  Urethra:   Urethral Body:  Urethra palpation normal, urethra structural support normal   Urethral Meatus:  No erythema or lesions present  Cervix:     Appearance healthy, no lesions present, nontender to palpation, no bleeding present  Uterus:     Uterus: firm, normal sized and nontender, midplane in position.   Adnexa:     Bilateral adnexal tenderness present, no adnexal masses present  Perineum:     Perineum within normal limits, no evidence of trauma, no rashes or skin lesions present  Genitalia and Groin:     No  rashes present, no lesions present, no areas of discoloration, no masses present      COUNSELING:   Reviewed preventive health counseling, as reflected in patient instructions    BMI: Body mass index is 30.55 kg/m .      ASSESSMENT:  45 year old female with satisfactory annual exam.    ICD-10-CM    1. Encounter for gynecological examination without abnormal finding  Z01.419    2. Screening for cervical cancer  Z12.4 Pap imaged thin layer screen with HPV - recommended age 30 - 65     HPV High Risk Types DNA Cervical   3. Breast lump on left side at 6 o'clock position  N63.25 MA Diagnostic Digital Bilateral     US Breast Left Limited 1-3 Quadrants   4. Lipid screening  Z13.220 Lipid Profile (Chol, Trig, HDL, LDL calc)   5. Screening for endocrine, metabolic and immunity disorder  Z13.29 Comprehensive metabolic panel (BMP + Alb, Alk Phos, ALT, AST, Total. Bili, TP)    Z13.228     Z13.0    6. Screening, anemia, deficiency, iron  Z13.0 CBC with platelets     Ferritin   7. Screening for thyroid disorder  Z13.29 TSH with free T4 reflex   8. Screening for diabetes mellitus  Z13.1 Hemoglobin A1c       PLAN:  I recommend diagnostic imaging of the left breast at the breast center. It may just be benign fibrocystic changes but it is different than the right breast on palpation.  Screening labs were ordered today.  May was encouraged to quit smoking and she agreed to try Wellbutrin. Potential side effects discussed. 12 week course prescribed.  May was counseled about her abnormal uterine bleeding. Will check a CBC and ferritin level. Given the anovulatory nature, I recommend a progesterone IUD which she accepts and will return for.       Palma Arrington MD

## 2021-06-03 ENCOUNTER — OFFICE VISIT (OUTPATIENT)
Dept: OBGYN | Facility: CLINIC | Age: 46
End: 2021-06-03
Payer: COMMERCIAL

## 2021-06-03 VITALS
BODY MASS INDEX: 30.01 KG/M2 | DIASTOLIC BLOOD PRESSURE: 78 MMHG | WEIGHT: 198 LBS | SYSTOLIC BLOOD PRESSURE: 122 MMHG | HEIGHT: 68 IN

## 2021-06-03 DIAGNOSIS — Z13.220 LIPID SCREENING: ICD-10-CM

## 2021-06-03 DIAGNOSIS — Z71.6 ENCOUNTER FOR SMOKING CESSATION COUNSELING: ICD-10-CM

## 2021-06-03 DIAGNOSIS — Z13.29 SCREENING FOR THYROID DISORDER: ICD-10-CM

## 2021-06-03 DIAGNOSIS — Z13.228 SCREENING FOR ENDOCRINE, METABOLIC AND IMMUNITY DISORDER: ICD-10-CM

## 2021-06-03 DIAGNOSIS — N63.25 BREAST LUMP ON LEFT SIDE AT 6 O'CLOCK POSITION: ICD-10-CM

## 2021-06-03 DIAGNOSIS — Z13.29 SCREENING FOR ENDOCRINE, METABOLIC AND IMMUNITY DISORDER: ICD-10-CM

## 2021-06-03 DIAGNOSIS — Z13.0 SCREENING, ANEMIA, DEFICIENCY, IRON: ICD-10-CM

## 2021-06-03 DIAGNOSIS — Z13.1 SCREENING FOR DIABETES MELLITUS: ICD-10-CM

## 2021-06-03 DIAGNOSIS — Z13.0 SCREENING FOR ENDOCRINE, METABOLIC AND IMMUNITY DISORDER: ICD-10-CM

## 2021-06-03 DIAGNOSIS — Z12.4 SCREENING FOR CERVICAL CANCER: ICD-10-CM

## 2021-06-03 DIAGNOSIS — Z01.419 ENCOUNTER FOR GYNECOLOGICAL EXAMINATION WITHOUT ABNORMAL FINDING: Primary | ICD-10-CM

## 2021-06-03 LAB
ALBUMIN SERPL-MCNC: 3.5 G/DL (ref 3.4–5)
ALP SERPL-CCNC: 70 U/L (ref 40–150)
ALT SERPL W P-5'-P-CCNC: 64 U/L (ref 0–50)
ANION GAP SERPL CALCULATED.3IONS-SCNC: 5 MMOL/L (ref 3–14)
AST SERPL W P-5'-P-CCNC: 33 U/L (ref 0–45)
BILIRUB SERPL-MCNC: 0.9 MG/DL (ref 0.2–1.3)
BUN SERPL-MCNC: 12 MG/DL (ref 7–30)
CALCIUM SERPL-MCNC: 8.7 MG/DL (ref 8.5–10.1)
CHLORIDE SERPL-SCNC: 106 MMOL/L (ref 94–109)
CHOLEST SERPL-MCNC: 194 MG/DL
CO2 SERPL-SCNC: 26 MMOL/L (ref 20–32)
CREAT SERPL-MCNC: 0.84 MG/DL (ref 0.52–1.04)
ERYTHROCYTE [DISTWIDTH] IN BLOOD BY AUTOMATED COUNT: 12.8 % (ref 10–15)
FERRITIN SERPL-MCNC: 31 NG/ML (ref 12–150)
GFR SERPL CREATININE-BSD FRML MDRD: 84 ML/MIN/{1.73_M2}
GLUCOSE SERPL-MCNC: 97 MG/DL (ref 70–99)
HBA1C MFR BLD: 5.8 % (ref 0–5.6)
HCT VFR BLD AUTO: 41.4 % (ref 35–47)
HDLC SERPL-MCNC: 42 MG/DL
HGB BLD-MCNC: 13.2 G/DL (ref 11.7–15.7)
LDLC SERPL CALC-MCNC: 126 MG/DL
MCH RBC QN AUTO: 31.3 PG (ref 26.5–33)
MCHC RBC AUTO-ENTMCNC: 31.9 G/DL (ref 31.5–36.5)
MCV RBC AUTO: 98 FL (ref 78–100)
NONHDLC SERPL-MCNC: 152 MG/DL
PLATELET # BLD AUTO: 280 10E9/L (ref 150–450)
POTASSIUM SERPL-SCNC: 3.9 MMOL/L (ref 3.4–5.3)
PROT SERPL-MCNC: 7.1 G/DL (ref 6.8–8.8)
RBC # BLD AUTO: 4.22 10E12/L (ref 3.8–5.2)
SODIUM SERPL-SCNC: 137 MMOL/L (ref 133–144)
TRIGL SERPL-MCNC: 128 MG/DL
TSH SERPL DL<=0.005 MIU/L-ACNC: 1.81 MU/L (ref 0.4–4)
WBC # BLD AUTO: 11.3 10E9/L (ref 4–11)

## 2021-06-03 PROCEDURE — 99396 PREV VISIT EST AGE 40-64: CPT | Performed by: OBSTETRICS & GYNECOLOGY

## 2021-06-03 PROCEDURE — 82728 ASSAY OF FERRITIN: CPT | Performed by: OBSTETRICS & GYNECOLOGY

## 2021-06-03 PROCEDURE — 36415 COLL VENOUS BLD VENIPUNCTURE: CPT | Performed by: OBSTETRICS & GYNECOLOGY

## 2021-06-03 PROCEDURE — 85027 COMPLETE CBC AUTOMATED: CPT | Performed by: OBSTETRICS & GYNECOLOGY

## 2021-06-03 PROCEDURE — 80061 LIPID PANEL: CPT | Performed by: OBSTETRICS & GYNECOLOGY

## 2021-06-03 PROCEDURE — 83036 HEMOGLOBIN GLYCOSYLATED A1C: CPT | Performed by: OBSTETRICS & GYNECOLOGY

## 2021-06-03 PROCEDURE — 87624 HPV HI-RISK TYP POOLED RSLT: CPT | Performed by: OBSTETRICS & GYNECOLOGY

## 2021-06-03 PROCEDURE — G0145 SCR C/V CYTO,THINLAYER,RESCR: HCPCS | Performed by: OBSTETRICS & GYNECOLOGY

## 2021-06-03 PROCEDURE — 80053 COMPREHEN METABOLIC PANEL: CPT | Performed by: OBSTETRICS & GYNECOLOGY

## 2021-06-03 PROCEDURE — 84443 ASSAY THYROID STIM HORMONE: CPT | Performed by: OBSTETRICS & GYNECOLOGY

## 2021-06-03 RX ORDER — BUPROPION HYDROCHLORIDE 150 MG/1
150 TABLET, EXTENDED RELEASE ORAL 2 TIMES DAILY
Qty: 60 TABLET | Refills: 3 | Status: SHIPPED | OUTPATIENT
Start: 2021-06-03 | End: 2021-06-17

## 2021-06-03 ASSESSMENT — ANXIETY QUESTIONNAIRES
1. FEELING NERVOUS, ANXIOUS, OR ON EDGE: SEVERAL DAYS
2. NOT BEING ABLE TO STOP OR CONTROL WORRYING: SEVERAL DAYS
IF YOU CHECKED OFF ANY PROBLEMS ON THIS QUESTIONNAIRE, HOW DIFFICULT HAVE THESE PROBLEMS MADE IT FOR YOU TO DO YOUR WORK, TAKE CARE OF THINGS AT HOME, OR GET ALONG WITH OTHER PEOPLE: NOT DIFFICULT AT ALL
6. BECOMING EASILY ANNOYED OR IRRITABLE: SEVERAL DAYS
7. FEELING AFRAID AS IF SOMETHING AWFUL MIGHT HAPPEN: NOT AT ALL
5. BEING SO RESTLESS THAT IT IS HARD TO SIT STILL: NOT AT ALL
3. WORRYING TOO MUCH ABOUT DIFFERENT THINGS: SEVERAL DAYS
GAD7 TOTAL SCORE: 5

## 2021-06-03 ASSESSMENT — PATIENT HEALTH QUESTIONNAIRE - PHQ9
5. POOR APPETITE OR OVEREATING: SEVERAL DAYS
SUM OF ALL RESPONSES TO PHQ QUESTIONS 1-9: 5

## 2021-06-03 ASSESSMENT — MIFFLIN-ST. JEOR: SCORE: 1588.68

## 2021-06-04 ASSESSMENT — ANXIETY QUESTIONNAIRES: GAD7 TOTAL SCORE: 5

## 2021-06-07 LAB
COPATH REPORT: NORMAL
PAP: NORMAL

## 2021-06-11 LAB
FINAL DIAGNOSIS: NORMAL
HPV HR 12 DNA CVX QL NAA+PROBE: NEGATIVE
HPV16 DNA SPEC QL NAA+PROBE: NEGATIVE
HPV18 DNA SPEC QL NAA+PROBE: NEGATIVE
SPECIMEN DESCRIPTION: NORMAL
SPECIMEN SOURCE CVX/VAG CYTO: NORMAL

## 2021-06-15 ENCOUNTER — HOSPITAL ENCOUNTER (OUTPATIENT)
Dept: MAMMOGRAPHY | Facility: CLINIC | Age: 46
End: 2021-06-15
Attending: OBSTETRICS & GYNECOLOGY
Payer: COMMERCIAL

## 2021-06-15 DIAGNOSIS — N63.25 BREAST LUMP ON LEFT SIDE AT 6 O'CLOCK POSITION: ICD-10-CM

## 2021-06-15 PROCEDURE — 76642 ULTRASOUND BREAST LIMITED: CPT | Mod: LT

## 2021-06-15 PROCEDURE — G0279 TOMOSYNTHESIS, MAMMO: HCPCS

## 2021-06-17 ENCOUNTER — OFFICE VISIT (OUTPATIENT)
Dept: INTERNAL MEDICINE | Facility: CLINIC | Age: 46
End: 2021-06-17
Payer: COMMERCIAL

## 2021-06-17 VITALS
DIASTOLIC BLOOD PRESSURE: 78 MMHG | OXYGEN SATURATION: 98 % | BODY MASS INDEX: 29.94 KG/M2 | TEMPERATURE: 98.4 F | WEIGHT: 194 LBS | HEART RATE: 95 BPM | RESPIRATION RATE: 16 BRPM | SYSTOLIC BLOOD PRESSURE: 118 MMHG

## 2021-06-17 DIAGNOSIS — Z76.89 ENCOUNTER TO ESTABLISH CARE: Primary | ICD-10-CM

## 2021-06-17 DIAGNOSIS — J31.0 CHRONIC RHINITIS: ICD-10-CM

## 2021-06-17 DIAGNOSIS — K14.6 BURNING MOUTH SYNDROME: ICD-10-CM

## 2021-06-17 DIAGNOSIS — K21.9 GASTROESOPHAGEAL REFLUX DISEASE WITHOUT ESOPHAGITIS: ICD-10-CM

## 2021-06-17 DIAGNOSIS — F17.210: ICD-10-CM

## 2021-06-17 PROBLEM — Z86.32 HISTORY OF DIABETES MELLITUS ARISING IN PREGNANCY: Status: RESOLVED | Noted: 2018-10-17 | Resolved: 2021-06-17

## 2021-06-17 PROCEDURE — 99213 OFFICE O/P EST LOW 20 MIN: CPT | Performed by: INTERNAL MEDICINE

## 2021-06-17 RX ORDER — FLUTICASONE PROPIONATE 50 MCG
1-2 SPRAY, SUSPENSION (ML) NASAL DAILY
Qty: 16 G | Refills: 3 | Status: SHIPPED | OUTPATIENT
Start: 2021-06-17 | End: 2022-07-13

## 2021-06-17 RX ORDER — GABAPENTIN 100 MG/1
200 CAPSULE ORAL 2 TIMES DAILY
Qty: 360 CAPSULE | Refills: 3 | Status: SHIPPED | OUTPATIENT
Start: 2021-06-17 | End: 2022-07-12

## 2021-06-17 RX ORDER — BUPROPION HYDROCHLORIDE 150 MG/1
150 TABLET, EXTENDED RELEASE ORAL 2 TIMES DAILY
Qty: 180 TABLET | Refills: 3 | Status: SHIPPED | OUTPATIENT
Start: 2021-06-17 | End: 2022-07-12

## 2021-06-17 RX ORDER — CETIRIZINE HYDROCHLORIDE 10 MG/1
10 TABLET ORAL EVERY EVENING
Qty: 90 TABLET | Refills: 3 | Status: SHIPPED | OUTPATIENT
Start: 2021-06-17 | End: 2022-07-13

## 2021-06-17 RX ORDER — AMITRIPTYLINE HYDROCHLORIDE 10 MG/1
10 TABLET ORAL AT BEDTIME
Qty: 90 TABLET | Refills: 3 | Status: SHIPPED | OUTPATIENT
Start: 2021-06-17 | End: 2022-07-12

## 2021-06-17 NOTE — PROGRESS NOTES
ASSESSMENT/PLAN                                                       (Z76.89) Encounter to establish care  (primary encounter diagnosis)  Comment: PMH, PSH, FH, SH, medications, allergies, immunizations, and preventative health measures reviewed and updated as appropriate.  Plan: see below for plans.      (J31.0) Chronic rhinitis  Comment: well-controlled on current regimen.    Plan: continue present management; refills provided.     (K14.6) Burning mouth syndrome  Comment: reasonably controlled on current regimen.   Plan: continue present management; refills provided.     (K21.9) Gastroesophageal reflux disease without esophagitis  Comment: well-controlled on current regimen.    Plan: continue present management; refills provided.     (F17.210) Smokes 5 cigarettes or less per day  Plan: refills of Wellbutrin provided.     Lashell Brambila MD   [address] [address]  T: [phone], F: [phone]    SUBJECTIVE                                                      May Arrieta is a very pleasant 45 year old female who presents to establish care:    PMH, PSH, FH, SH, medications, allergies, immunizations, and preventative health measures reviewed and updated as appropriate.    Past Medical History:   Diagnosis Date     Burning mouth syndrome      Multiple environmental allergies      Past Surgical History:   Procedure Laterality Date     ESSURE TUBAL LIGATION  2010     LAPAROSCOPIC CYSTECTOMY OVARIAN (BENIGN)  05/12/2013    Procedure: LAPAROSCOPIC CYSTECTOMY OVARIAN (BENIGN);  Diagnostic laparoscopy, left ovarian cystectomy ;  Surgeon: Ignacia Madrid DO;  Location:  OR     LAPAROSCOPY DIAGNOSTIC (GYN)  05/12/2013    Procedure: LAPAROSCOPY DIAGNOSTIC (GYN);;  Surgeon: Ignacia Madrid DO;  Location:  OR     Family History   Problem Relation Age of Onset     Depression Mother      Hypertension Mother      Diabetes Type 2  Mother      Breast Cancer  Paternal Grandmother      Diabetes Type 2  Paternal Grandmother      Diabetes Type 2  Paternal Grandfather      Myocardial Infarction Paternal Grandfather      Colon Cancer Maternal Grandmother      Myocardial Infarction Maternal Grandfather      Cerebrovascular Disease No family hx of      Coronary Artery Disease Early Onset No family hx of      Ovarian Cancer No family hx of      Social History     Occupational History     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    2 adult children.    3 grandchildren.    Very active jobs; no formal exercise.     Allergies   Allergen Reactions     Doxycycline Nausea     Penicillins Headache and Nausea and Vomiting     Current Outpatient Medications   Medication Sig     amitriptyline (ELAVIL) 10 MG tablet Take 1 tablet (10 mg) by mouth At Bedtime     buPROPion (WELLBUTRIN SR) 150 MG 12 hr tablet Take 1 tablet (150 mg) by mouth 2 times daily     cetirizine (ZYRTEC) 10 MG tablet Take 1 tablet (10 mg) by mouth every evening     fluticasone (FLONASE) 50 MCG/ACT nasal spray Spray 1-2 sprays into both nostrils daily     gabapentin (NEURONTIN) 100 MG capsule Take 2 capsules (200 mg) by mouth 2 times daily     MELATONIN PO      omeprazole (PRILOSEC) 20 MG DR capsule Take 1 capsule (20 mg) by mouth daily     Immunization History   Administered Date(s) Administered     COVID-19,VALENTÍN,Pfizer 12/21/2020, 01/06/2021     Flu, Unspecified 10/01/2019, 10/02/2020     HepB, Unspecified 06/29/2001, 08/08/2001     Influenza  (H1N1) 11/14/2009     Influenza (IIV3) PF 11/21/2006, 11/12/2008, 11/03/2010, 09/14/2011     Influenza Vaccine IM > 6 months Valent IIV4 11/09/2019     Mantoux Tuberculin Skin Test 09/21/2016     TD (ADULT, 7+) 06/29/2006     PREVENTATIVE HEALTH                                                      BMI: overweight  Blood pressure: within normal limits   Breast CA screening: up to date   Cervical CA screening: up to date   Colon CA screening: not medically indicated at this time   Lung CA screening: patient does not meet screening criteria  Dexa: not medically indicated at this time   Screening cholesterol: up to date   Screening diabetes: up to date   STD testing: no risk factors present  Alcohol misuse screening: alcohol use reviewed - no intervention indicated at this time  Immunizations: reviewed; TDAP DUE - patient declines    OBJECTIVE                                                      /78 (BP Location: Left arm, Patient Position: Chair, Cuff Size: Adult Regular)   Pulse 95   Temp 98.4  F (36.9  C) (Temporal)   Resp 16   Wt 88 kg (194 lb)   SpO2 98%   BMI 29.94 kg/m    Constitutional: well-appearing    ---  (Note was completed, in part, with [employer] voice-recognition software. Documentation was reviewed, but some grammatical, spelling, and word errors may remain.)

## 2021-08-01 ENCOUNTER — MYC MEDICAL ADVICE (OUTPATIENT)
Dept: INTERNAL MEDICINE | Facility: CLINIC | Age: 46
End: 2021-08-01

## 2021-08-02 NOTE — TELEPHONE ENCOUNTER
"PCP:  Please see Mary question/request.      My neck, shoulder and mouth pain have been extremely bad this month.  I have been seeing my wellness/chiropractor every other week, she had some natural vitamins their I bought to take away inflammation.  It is not working.  I looked back at my records for [facility] when I used to go there, my insurance does not cover that anymore, but they gave me Prednisone.  Anurag wondering if I can try this again the pain is sometimes unbearable.  Do you think this might help  with inflammation?  Anurag willing to try anything and everything,  This is the worst not knowing what this is exactly and how to handle it:(     Brief history incase you don't remember:  After a 1 1/2 years of pain going from doctor to doctor and dentist to dentist a neurologist and a periodontist diagnosed me with \"burning mouth syndrome\".  They believe this is a neuro/nerve problem.  Anurag trying not have to go back to specialist doctors if I dont need to, I have enough medical bills after all this.      Would you prefer some type of visit?  Last OV  6/17 to establish care.  You did briefly address burning mouth syndrome at that visit.    Bianca Reyes, MSN, RN  Triage RN  [facility]    "

## 2021-08-12 ENCOUNTER — OFFICE VISIT (OUTPATIENT)
Dept: INTERNAL MEDICINE | Facility: CLINIC | Age: 46
End: 2021-08-12
Payer: COMMERCIAL

## 2021-08-12 VITALS
RESPIRATION RATE: 18 BRPM | WEIGHT: 193 LBS | TEMPERATURE: 99 F | DIASTOLIC BLOOD PRESSURE: 90 MMHG | OXYGEN SATURATION: 100 % | BODY MASS INDEX: 29.78 KG/M2 | HEART RATE: 98 BPM | SYSTOLIC BLOOD PRESSURE: 132 MMHG

## 2021-08-12 DIAGNOSIS — M54.2 NECK PAIN: ICD-10-CM

## 2021-08-12 DIAGNOSIS — M79.601 PAIN OF RIGHT UPPER EXTREMITY: Primary | ICD-10-CM

## 2021-08-12 DIAGNOSIS — K14.6 BURNING MOUTH SYNDROME: ICD-10-CM

## 2021-08-12 PROCEDURE — 99214 OFFICE O/P EST MOD 30 MIN: CPT | Performed by: INTERNAL MEDICINE

## 2021-08-12 RX ORDER — METHYLPREDNISOLONE 4 MG
TABLET, DOSE PACK ORAL
Qty: 21 TABLET | Refills: 0 | Status: SHIPPED | OUTPATIENT
Start: 2021-08-12 | End: 2022-07-13

## 2021-08-12 NOTE — PATIENT INSTRUCTIONS
Medrol dose pack prescribed - please use as directed.    Referred for PT - you will be contacted to schedule.

## 2021-08-12 NOTE — PROGRESS NOTES
ASSESSMENT/PLAN                                                      (M79.601) Pain of right upper extremity  (primary encounter diagnosis)  (M54.2) Neck pain  Comment: suspect cervical radiculopathy.  Plan: Medrol Dosepak prescribed; patient referred to physical therapy for further evaluation and treatment - patient will be contacted to schedule.      (K14.6) Burning mouth syndrome  Comment: poorly controlled with gabapentin and amitriptyline; followed by neurology  Plan: suggested trial of pramipexole - patient will discuss with her neurologist.    Lashell Brambila MD   [place] [address] [address] [address]  T: [phone], F: [phone]    SUBJECTIVE                                                      May Arrieta is a very pleasant 46 year old female who presents with right upper extremity and neck pain:    Symptoms have been ongoing for the last month. Symptoms include pulling and shooting pains and numbness from her neck to her thumb and pointer finger. Has been seeing her chiropractor with mild relief of symptoms.    Patient has also been struggling with burning mouth syndrome currently. She is currently on amitriptyline 10 mg at night and gabapentin 900 mg twice a day. These medications have helped, but she continues to struggle with symptoms.    OBJECTIVE                                                      BP (!) 132/90 (BP Location: Left arm, Patient Position: Chair, Cuff Size: Adult Regular)   Pulse 98   Temp 99  F (37.2  C) (Temporal)   Resp 18   Wt 87.5 kg (193 lb)   SpO2 100%   BMI 29.78 kg/m    Constitutional: well-appearing    ---    (Note documentation was completed, in part, with [employer] voice-recognition software. Documentation was reviewed, but some grammatical, spelling, and word errors may remain.)

## 2021-10-24 ENCOUNTER — HEALTH MAINTENANCE LETTER (OUTPATIENT)
Age: 46
End: 2021-10-24

## 2021-11-11 ENCOUNTER — MYC REFILL (OUTPATIENT)
Dept: INTERNAL MEDICINE | Facility: CLINIC | Age: 46
End: 2021-11-11
Payer: COMMERCIAL

## 2021-11-11 DIAGNOSIS — M79.601 PAIN OF RIGHT UPPER EXTREMITY: ICD-10-CM

## 2021-11-11 DIAGNOSIS — M54.2 NECK PAIN: ICD-10-CM

## 2021-11-11 RX ORDER — METHYLPREDNISOLONE 4 MG
TABLET, DOSE PACK ORAL
Qty: 21 TABLET | Refills: 0 | OUTPATIENT
Start: 2021-11-11

## 2021-11-11 NOTE — LETTER
[facility]  [address] [address]  [phone]      11/17/2021       May Arrieta  [address] [place] [id number]        Dear May,    In reviewing your recent refill request, it was noted that you are due for an appointment with your physician to discuss continued refills of this medication. Refills will be approved during your follow up visit.    Please visit [facility] or call the clinic to schedule an appointment at [phone] at your convenience.    Sincerely,      [facility] Internal Medicine

## 2021-11-11 NOTE — TELEPHONE ENCOUNTER
Routing refill request to provider for review/approval because:  Drug not on the [facility] refill protocol     Ezekiel Reyes RN  [facility] Triage Nurse

## 2022-01-13 ENCOUNTER — IMMUNIZATION (OUTPATIENT)
Dept: NURSING | Facility: CLINIC | Age: 47
End: 2022-01-13
Payer: COMMERCIAL

## 2022-01-13 PROCEDURE — 0054A COVID-19,PF,PFIZER (12+ YRS): CPT

## 2022-01-13 PROCEDURE — 91305 COVID-19,PF,PFIZER (12+ YRS): CPT

## 2022-03-23 ENCOUNTER — TRANSFERRED RECORDS (OUTPATIENT)
Dept: HEALTH INFORMATION MANAGEMENT | Facility: CLINIC | Age: 47
End: 2022-03-23
Payer: COMMERCIAL

## 2022-03-24 ENCOUNTER — TRANSFERRED RECORDS (OUTPATIENT)
Dept: PALLIATIVE MEDICINE | Facility: CLINIC | Age: 47
End: 2022-03-24

## 2022-03-29 ENCOUNTER — TRANSFERRED RECORDS (OUTPATIENT)
Dept: HEALTH INFORMATION MANAGEMENT | Facility: CLINIC | Age: 47
End: 2022-03-29
Payer: COMMERCIAL

## 2022-04-04 ENCOUNTER — TRANSFERRED RECORDS (OUTPATIENT)
Dept: PALLIATIVE MEDICINE | Facility: CLINIC | Age: 47
End: 2022-04-04

## 2022-04-06 ENCOUNTER — TRANSFERRED RECORDS (OUTPATIENT)
Dept: HEALTH INFORMATION MANAGEMENT | Facility: CLINIC | Age: 47
End: 2022-04-06
Payer: COMMERCIAL

## 2022-04-20 ENCOUNTER — TRANSFERRED RECORDS (OUTPATIENT)
Dept: HEALTH INFORMATION MANAGEMENT | Facility: CLINIC | Age: 47
End: 2022-04-20
Payer: COMMERCIAL

## 2022-06-24 DIAGNOSIS — K14.6 BURNING MOUTH SYNDROME: ICD-10-CM

## 2022-06-24 RX ORDER — AMITRIPTYLINE HYDROCHLORIDE 10 MG/1
10 TABLET ORAL AT BEDTIME
Qty: 90 TABLET | Refills: 0 | OUTPATIENT
Start: 2022-06-24

## 2022-06-24 NOTE — TELEPHONE ENCOUNTER
Routing refill request to provider for review/approval because:  BP Readings from Last 3 Encounters:   08/12/21 (!) 132/90   06/17/21 118/78   06/03/21 122/78         Ezekiel Reyes RN  [facility] Triage Nurse

## 2022-06-25 NOTE — TELEPHONE ENCOUNTER
She is overdue for her annual exam. Please ask her to schedule this appointment ASAP. Can refill medication temporarily if she anticipates running out prior to scheduled appointment.     Thank you.     ---    May use any virtual spot for an in-person visit.     Patient may also be seen at noon (arrival time 11:40am) Mondays, Wednesdays, Thursdays, and Fridays OR at 1:00pm (arrival time 12:40pm) on Thursdays (during the huddle).    Please do not schedule in a same day, next day, or hospital follow-up slot.

## 2022-07-08 DIAGNOSIS — K14.6 BURNING MOUTH SYNDROME: ICD-10-CM

## 2022-07-08 DIAGNOSIS — F17.210: ICD-10-CM

## 2022-07-10 DIAGNOSIS — K14.6 BURNING MOUTH SYNDROME: ICD-10-CM

## 2022-07-12 RX ORDER — AMITRIPTYLINE HYDROCHLORIDE 10 MG/1
10 TABLET ORAL AT BEDTIME
Qty: 90 TABLET | Refills: 3 | Status: SHIPPED | OUTPATIENT
Start: 2022-07-12 | End: 2023-07-31

## 2022-07-12 RX ORDER — BUPROPION HYDROCHLORIDE 150 MG/1
150 TABLET, EXTENDED RELEASE ORAL 2 TIMES DAILY
Qty: 180 TABLET | Refills: 3 | Status: SHIPPED | OUTPATIENT
Start: 2022-07-12 | End: 2024-02-21

## 2022-07-12 RX ORDER — GABAPENTIN 100 MG/1
200 CAPSULE ORAL 2 TIMES DAILY
Qty: 360 CAPSULE | Refills: 3 | Status: SHIPPED | OUTPATIENT
Start: 2022-07-12 | End: 2024-02-21

## 2022-07-13 ENCOUNTER — VIRTUAL VISIT (OUTPATIENT)
Dept: INTERNAL MEDICINE | Facility: CLINIC | Age: 47
End: 2022-07-13
Payer: COMMERCIAL

## 2022-07-13 DIAGNOSIS — Z12.31 ENCOUNTER FOR SCREENING MAMMOGRAM FOR BREAST CANCER: ICD-10-CM

## 2022-07-13 DIAGNOSIS — K21.9 GASTROESOPHAGEAL REFLUX DISEASE WITHOUT ESOPHAGITIS: ICD-10-CM

## 2022-07-13 DIAGNOSIS — F17.200 CURRENT SMOKER: ICD-10-CM

## 2022-07-13 DIAGNOSIS — Z12.11 SPECIAL SCREENING FOR MALIGNANT NEOPLASMS, COLON: ICD-10-CM

## 2022-07-13 DIAGNOSIS — K14.6 BURNING MOUTH SYNDROME: ICD-10-CM

## 2022-07-13 DIAGNOSIS — J31.0 CHRONIC RHINITIS: ICD-10-CM

## 2022-07-13 DIAGNOSIS — Z13.1 SCREENING FOR DIABETES MELLITUS: ICD-10-CM

## 2022-07-13 DIAGNOSIS — Z91.09 MULTIPLE ENVIRONMENTAL ALLERGIES: ICD-10-CM

## 2022-07-13 DIAGNOSIS — Z13.220 SCREENING FOR CHOLESTEROL LEVEL: ICD-10-CM

## 2022-07-13 DIAGNOSIS — U07.1 INFECTION DUE TO 2019 NOVEL CORONAVIRUS: Primary | ICD-10-CM

## 2022-07-13 PROCEDURE — 99214 OFFICE O/P EST MOD 30 MIN: CPT | Mod: 95 | Performed by: INTERNAL MEDICINE

## 2022-07-13 RX ORDER — CETIRIZINE HYDROCHLORIDE 10 MG/1
10 TABLET ORAL EVERY EVENING
Qty: 90 TABLET | Refills: 3 | Status: SHIPPED | OUTPATIENT
Start: 2022-07-13 | End: 2023-08-11

## 2022-07-13 RX ORDER — FLUTICASONE PROPIONATE 50 MCG
1-2 SPRAY, SUSPENSION (ML) NASAL DAILY
Qty: 16 G | Refills: 3 | Status: SHIPPED | OUTPATIENT
Start: 2022-07-13 | End: 2024-02-21

## 2022-07-13 RX ORDER — OMEPRAZOLE 40 MG/1
40 CAPSULE, DELAYED RELEASE ORAL DAILY
Qty: 90 CAPSULE | Refills: 3 | Status: SHIPPED | OUTPATIENT
Start: 2022-07-13 | End: 2022-10-24

## 2022-07-13 NOTE — PATIENT INSTRUCTIONS
Refills of all prescription medications have been sent to [facility].    Please schedule your screening mammogram when able.    Please schedule fasting labs when able.    You are due for colon cancer screening.  A screening colonoscopy is the gold standard for colon cancer screening.  The stool kit (FIT test or Cologuard) is a reasonable alternative. Please let me know which you would prefer.    ---    You have been prescribed Paxlovid for COVID-19.  This has been sent to the [facility] pharmacy in [place].    Paxlovid significantly decreases your risk of hospitalization and death from COVID-19. Paxlovid may decrease the duration and severity of your symptoms.    This medication is taking twice a day for 5 days.  The most common side effects are loose stools, headaches, and loss of smell.    You may use over-the-counter analgesics (Tylenol and ibuprofen) for headaches and body aches.  You may use Tylenol for subjective fevers and chills.  You may use over-the-counter cough and sinus medications for coughing and sinus symptoms.    If you have difficulty breathing, keeping fluids down, or become confused, please proceed to the nearest ER.

## 2022-07-13 NOTE — PROGRESS NOTES
VIDEO VISIT                                                       ASSESSMENT/PLAN                                                       (U07.1) Infection due to 2019 novel coronavirus  (primary encounter diagnosis)  (F17.200) Current smoker  Comment: patient is an appropriate candidate for oral anti-viral therapy.   Plan: Paxlovid prescribed - patient to use as directed; potential side effects discussed with and accepted by patient; HOLD Flonase while taking Paxlovid; may continue all other medications without change; red flag symptoms and supportive care measures discussed with patient.     (K14.6) Burning mouth syndrome  Comment: well-controlled on current regimen.    Plan: continue present management; refills provided.     (Z91.09) Multiple environmental allergies  Comment: well-controlled on current regimen.    Plan: continue present management; refills provided.     (J31.0) Chronic rhinitis  Comment: well-controlled on current regimen.    Plan: continue present management; refills provided.     (K21.9) Gastroesophageal reflux disease without esophagitis  Comment: well-controlled on current regimen.    Plan: continue present management; refills provided.     (Z12.31) Encounter for screening mammogram for breast cancer  Plan: screening mammogram ordered - patient to schedule.     (Z12.11) Special screening for malignant neoplasms, colon  Plan: patient will consider screening options and get back to me re: choice.     (Z13.220) Screening for cholesterol level  (Z13.1) Screening for diabetes mellitus  Plan: fasting labs ordered - patient to schedule.     Total time of video call between patient and provider was 11 minutes (9:08-9:19am). Provider location: office. Patient location: home. Platform: [email or web address].     Lashell Brambila MD   [employer]  [address] [place] [id number]  T: [phone], F: [phone]    TYLER RODRIGEUZ  Meenakshi is a very pleasant 47 year old female who presents for a health history and medication review:    Our visit was supposed to be in-person but she came down with COVID-19. Symptoms started 7/11/22. Tested positive 7/12/22.    No pertinent PMH. Normal kidney function.    SH significant for current smoking.    Vaccinated and booster against COVID-19 but booster was in January, 2022.    PMH, PSH, FH, SH, medications, allergies, immunizations, and preventative health measures reviewed and updated as appropriate.    Past Medical History:   Diagnosis Date     Burning mouth syndrome      Multiple environmental allergies      Past Surgical History:   Procedure Laterality Date     ESSURE TUBAL LIGATION  2010     LAPAROSCOPIC CYSTECTOMY OVARIAN (BENIGN)  05/12/2013    Procedure: LAPAROSCOPIC CYSTECTOMY OVARIAN (BENIGN);  Diagnostic laparoscopy, left ovarian cystectomy ;  Surgeon: Ignacia Madrid DO;  Location:  OR     LAPAROSCOPY DIAGNOSTIC (GYN)  05/12/2013    Procedure: LAPAROSCOPY DIAGNOSTIC (GYN);;  Surgeon: Ignacia Madrid DO;  Location:  OR     Family History   Problem Relation Age of Onset     Depression Mother      Hypertension Mother      Diabetes Type 2  Mother      Post-Traumatic Stress Disorder (PTSD) Father      Colon Cancer Maternal Grandmother      Myocardial Infarction Maternal Grandfather      Breast Cancer Paternal Grandmother      Diabetes Type 2  Paternal Grandmother      Diabetes Type 2  Paternal Grandfather      Myocardial Infarction Paternal Grandfather      Cerebrovascular Disease No family hx of      Coronary Artery Disease Early Onset No family hx of      Ovarian Cancer No family hx of      Social History     Occupational History     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    2 adult children.    4 grandchildren.    Very active jobs; no formal exercise.     Allergies   Allergen Reactions     Doxycycline Nausea     Penicillins Headache and Nausea and Vomiting     Current Outpatient Medications   Medication Sig     amitriptyline (ELAVIL) 10 MG tablet Take 1 tablet (10 mg) by mouth At Bedtime     buPROPion (WELLBUTRIN SR) 150 MG 12 hr tablet Take 1 tablet (150 mg) by mouth 2 times daily     cetirizine (ZYRTEC) 10 MG tablet Take 1 tablet (10 mg) by mouth every evening     fluticasone (FLONASE) 50 MCG/ACT nasal spray Spray 1-2 sprays into both nostrils daily     gabapentin (NEURONTIN) 100 MG capsule Take 2 capsules (200 mg) by mouth 2 times daily     MELATONIN PO      nirmatrelvir and ritonavir (PAXLOVID) therapy pack Take 3 tablets by mouth 2 times daily     omeprazole (PRILOSEC) 40 MG DR capsule Take 1 capsule (40 mg) by mouth daily     Immunization History   Administered Date(s) Administered     COVID-19,PF,Pfizer (12+ Yrs) 12/21/2020, 01/06/2021     COVID-19,PF,Pfizer 12+ Yrs (2022 and After) 01/13/2022     Flu, Unspecified 10/01/2019, 10/02/2020     HepB, Unspecified 06/29/2001, 08/08/2001     Influenza (H1N1) 11/14/2009     Influenza (IIV3) PF 11/21/2006, 11/12/2008, 11/03/2010, 09/14/2011     Influenza Vaccine IM > 6 months Valent IIV4 (Alfuria,Fluzone) 11/09/2019     Mantoux Tuberculin Skin Test 09/21/2016     TD (ADULT, 7+) 06/29/2006     PREVENTATIVE HEALTH                                                      Breast CA screening: DUE  Cervical CA screening: up to date   Colon CA screening: DUE  Lung CA screening: patient does not meet screening  criteria  Dexa: not medically indicated at this time   Screening cholesterol: DUE  Screening diabetes: DUE  STD testing: no risk factors present  Alcohol misuse screening: alcohol use reviewed - no intervention indicated at this time  Immunizations: reviewed; Prevnar 20 DUE    OBJECTIVE                                                      Vitals: No vitals were obtained today due to virtual visit.    General: appears healthy, alert and no distress  Psychiatric: mentation normal, affect normal/bright, judgement and insight intact, normal speech and appearance well-groomed    ---  (Note was completed, in part, with [employer] voice-recognition software. Documentation was reviewed, but some grammatical, spelling, and word errors may remain.)

## 2022-07-31 ENCOUNTER — HEALTH MAINTENANCE LETTER (OUTPATIENT)
Age: 47
End: 2022-07-31

## 2022-08-10 ENCOUNTER — ANCILLARY PROCEDURE (OUTPATIENT)
Dept: MAMMOGRAPHY | Facility: CLINIC | Age: 47
End: 2022-08-10
Attending: INTERNAL MEDICINE
Payer: COMMERCIAL

## 2022-08-10 DIAGNOSIS — Z12.31 VISIT FOR SCREENING MAMMOGRAM: ICD-10-CM

## 2022-08-10 DIAGNOSIS — Z12.31 ENCOUNTER FOR SCREENING MAMMOGRAM FOR BREAST CANCER: ICD-10-CM

## 2022-08-10 PROCEDURE — 77067 SCR MAMMO BI INCL CAD: CPT | Mod: TC | Performed by: RADIOLOGY

## 2022-09-13 ENCOUNTER — OFFICE VISIT (OUTPATIENT)
Dept: PALLIATIVE MEDICINE | Facility: CLINIC | Age: 47
End: 2022-09-13
Payer: COMMERCIAL

## 2022-09-13 VITALS — DIASTOLIC BLOOD PRESSURE: 88 MMHG | SYSTOLIC BLOOD PRESSURE: 132 MMHG | HEART RATE: 106 BPM | OXYGEN SATURATION: 96 %

## 2022-09-13 DIAGNOSIS — M54.12 CERVICAL RADICULOPATHY: Primary | ICD-10-CM

## 2022-09-13 PROCEDURE — 99203 OFFICE O/P NEW LOW 30 MIN: CPT | Performed by: PHYSICAL MEDICINE & REHABILITATION

## 2022-09-13 RX ORDER — METHYLPREDNISOLONE 4 MG
TABLET, DOSE PACK ORAL
Qty: 21 TABLET | Refills: 0 | Status: SHIPPED | OUTPATIENT
Start: 2022-09-13 | End: 2022-09-17

## 2022-09-13 ASSESSMENT — PAIN SCALES - GENERAL: PAINLEVEL: SEVERE PAIN (6)

## 2022-09-13 NOTE — PROGRESS NOTES
[facility] Medical Spine Consultation    Date of visit: 9/13/2022    Assessment:  May Arrieta is a 47 year old female with a past medical history significant for burning mouth syndrome who presents with complaints of:    1. Right sided neck pain with radiation into the right arm: May reports onset of right-sided neck pain with radiation into the right arm and hand started about a year ago.  She was previously seen at [facility] for this issue earlier this year.  She did physical therapy and had a right C6 nerve root injection.  She states that the injection was helpful for 4 to 5 months.  She was able to have more function of her right arm due to the reduced pain.  Now the pain has returned to baseline.  At this time it is fairly constant.  On exam she has reduced cervical range of motion secondary to pain, tenderness in the right trapezius, altered sensation in the right upper extremity particularly the right thumb.  Strength is 5/ 5 in bilateral upper extremities.  She had a cervical MRI done at [facility] prior to her injection.  This is not available for review today.  However in review of progress notes it seems she does have some significant stenosis at the right C5-6 foramen.  Etiology of her symptoms is likely multifactorial secondary to cervical radiculopathy and myofascial pain.    Plan:  The following recommendations were given to the patient. Diagnosis, treatment options, risks, benefits, and alternatives were discussed, and all questions were answered. The patient expressed understanding of the plan for management.     1. Therapies: She will continue with her prior PT exercises.  2. Diagnostic Studies: Release of information form filled out to obtain cervical MRI images and report from [facility].  3. Medication Management:   1. We discussed proper doses of over-the-counter medications.  Discussed with her that she should not be using more than 3000 mg of  Tylenol in a day or over 2400 mg of ibuprofen in a day.  2. She is on gabapentin for burning mouth syndrome.  She takes 100 mg in the morning and 200 mg at night. She may increase the nighttime dose to 300 mg.  4. Procedures recommended: Order placed for a repeat right C6 transforaminal epidural steroid injection since she had significant benefit with it previously  5. Follow up: 2 weeks after injection    Reason for consultation:    Primary Care Provider is Lashell Brambila    May Arrieta is a 47 year old female who was self referred for evaluation of right sided neck pain.      Consultation and Evaluation for: Neck pain    Review of Electronic Chart: Today I have also reviewed available medical information in the patient's medical record at [facility] ([facility]), including relevant provider notes, laboratory work, and imaging.     Chief Complaint:    Chief Complaint   Patient presents with     Pain       History:  May Arrieta is a 47 year old female who presents for initial evaluation of chief pain complaint of right sided neck pain. Pain started about a year ago. No specific inciting event. Came on fairly suddenly. Initially started just in the neck and then over course of few days spread down to the arm and into the hand.  She reports symptoms mainly into the thumb.  She describes the pain as ranging from dull to aching to heavy.  The pain is fairly constant at this point.  It is aggravated with any movement of the arm.  Feels like a lightening bolt in her arm.  She has been seen for this previously at [facility] earlier this year.  She did physical therapy and had a right C6 nerve root injection.  She reports having good benefit from the injection.  She describes having about 4 to 5 months of pain relief.  She was able to have better function of her arm during that time.  The symptoms she is having now are the same as they were previously.    Severity/Intensity: 10/10 at worst, 5/10 at  best  Aggravating factors include: using the arm  Relieving factors include: rest, laying down   Red Flags: Feels some weakness in the hand. Trouble grabbing things. The patient denies bowel or bladder incontinence,unintentional weight loss, or fever/chills/sweats, gait instability.     Medications:       Current pain medications:  Tylenol 1,000 mg q 4 hrs prn - takes about 6 tabs per day  Advil 600-800 mg - 8 tabs per day  Topical with menthol - minimal benefit temporarily  Amitriptyline 10 mg q hs   Bupropion 150 mg BID  Gabapentin 200 mg BID       Past Pain Treatments:  PT: Yes - She did this previously at [facility] and with her chiropractor - ?  TENs Unit: No  Injections: Yes - Right C6 nerve root injection - H for 4-5 months  Self-care:   Yes - ice - [facility]  Surgeries related to pain: None  Alternative Therapies:    Chiropractic: yes - temporary benefit   Acupuncture: No    Diagnostic tests:  No imaging or reports available to review. Per review of notes from [facility] it seems she has significant stenosis at the right C5-6 foramen.     EMG/Testing:  none    Labs:   Creatine 0.84    Past Medical History:  Past Medical History:   Diagnosis Date     Burning mouth syndrome      Multiple environmental allergies        Past Surgical History:  Past Surgical History:   Procedure Laterality Date     ESSURE TUBAL LIGATION  2010     LAPAROSCOPIC CYSTECTOMY OVARIAN (BENIGN)  05/12/2013    Procedure: LAPAROSCOPIC CYSTECTOMY OVARIAN (BENIGN);  Diagnostic laparoscopy, left ovarian cystectomy ;  Surgeon: Ignacia Madrid DO;  Location:  OR     LAPAROSCOPY DIAGNOSTIC (GYN)  05/12/2013    Procedure: LAPAROSCOPY DIAGNOSTIC (GYN);;  Surgeon: Ignacia Madrid DO;  Location:  OR       Medications:  Current Outpatient Medications   Medication Sig Dispense Refill     amitriptyline (ELAVIL) 10 MG tablet Take 1 tablet (10 mg) by mouth At Bedtime 90 tablet 3     buPROPion (WELLBUTRIN SR) 150 MG 12 hr tablet Take 1  tablet (150 mg) by mouth 2 times daily 180 tablet 3     cetirizine (ZYRTEC) 10 MG tablet Take 1 tablet (10 mg) by mouth every evening 90 tablet 3     fluticasone (FLONASE) 50 MCG/ACT nasal spray Spray 1-2 sprays into both nostrils daily 16 g 3     gabapentin (NEURONTIN) 100 MG capsule Take 2 capsules (200 mg) by mouth 2 times daily 360 capsule 3     MELATONIN PO        nirmatrelvir and ritonavir (PAXLOVID) therapy pack Take 3 tablets by mouth 2 times daily 30 tablet 0     omeprazole (PRILOSEC) 40 MG DR capsule Take 1 capsule (40 mg) by mouth daily 90 capsule 3       Allergies:     Allergies   Allergen Reactions     Doxycycline Nausea     Penicillins Headache and Nausea and Vomiting       Family history:  Family History   Problem Relation Age of Onset     Depression Mother      Hypertension Mother      Diabetes Type 2  Mother      Post-Traumatic Stress Disorder (PTSD) Father      Colon Cancer Maternal Grandmother      Myocardial Infarction Maternal Grandfather      Breast Cancer Paternal Grandmother      Diabetes Type 2  Paternal Grandmother      Diabetes Type 2  Paternal Grandfather      Myocardial Infarction Paternal Grandfather      Cerebrovascular Disease No family hx of      Coronary Artery Disease Early Onset No family hx of      Ovarian Cancer No family hx of        Social History:  Home situation: Lives in [facility] 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  HEENT: Head atraumatic, normocephalic. Eyes without conjunctival injection or jaundice. Neck supple. No obvious neck masses.  Respiratory: unlabored breathing on room air  Skin:  No rash, lesions of exposed skin. a.  Psychiatric/mental status: Alert, without lethargy or stupor. Speech fluent. Appropriate affect. Mood normal. Able to follow commands without difficulty.     Musculoskeletal exam:  Gait/Station/Posture:   Normal stance, arm swing, and stride; no antalgia or Trendelenburg  Normal bulk and tone. Unremarkable spinal curvature.     Cervical spine:  Range of motion within normal limits but painful   Myofascial tenderness: present in right trapezius  No focal spinous process tenderness.   Spurling's negative bilaterally. She does have pain at the base of the neck with ROM    Neurologic exam:  CN:  Cranial nerves 2-12 are grossly intact  Motor Strength:  5/5 symmetric UE and LE strength    Reflexes:     Biceps C5:     R:  2/4 L: 2/4   Brachioradialis  C6: R:  2/4 L: 2/4   Triceps C7:  R:  2/4 L: 2/4     Sensory:  (upper extremities):   Light touch: altered on right   Allodynia: absent    Hyperalgesia: absent     Erin Mari MD  [facility] Pain Management       BILLING TIME DOCUMENTATION:   The total TIME spent on this patient on the date of the encounter/appointment was 40 minutes.      TOTAL TIME includes:   Time spent preparing to see the patient (reviewing records and tests)   Time spent face to face (or over the phone) with the patient   Time spent ordering tests, medications, procedures and referrals   Time spent documenting clinical information in Epic

## 2022-09-13 NOTE — PATIENT INSTRUCTIONS
Do not use more than 3,000 mg of tylenol in a day.   Do not use more than 2,400 mg of advil in a day.  Order placed for a repeat right C6 epidural injection.   Will get a release of information to get cervical imaging from [facility].   Follow up with me 2 weeks after injection.    Erin Mari MD  [facility] Pain Management     ----------------------------------------------------------------  Clinic Number:  [phone]   Call with any questions about your care and for scheduling assistance.   Calls are returned Monday through Friday between 8 AM and 4:30 PM. We usually get back to you within 2 business days depending on the issue/request.    If we are prescribing your medications:  For opioid medication refills, call the clinic or send a ngmoco message 7 days in advance.  Please include:  Name of requested medication  Name of the pharmacy.  For non-opioid medications, call your pharmacy directly to request a refill. Please allow 3-4 days to be processed.   Per [place] State Law:  All controlled substance prescriptions must be filled within 30 days of being written.    For those controlled substances allowing refills, pickup must occur within 30 days of last fill.      We believe regular attendance is key to your success in our program!    Any time you are unable to keep your appointment we ask that you call us at least 24 hours in advance to cancel.This will allow us to offer the appointment time to another patient.   Multiple missed appointments may lead to dismissal from the clinic.

## 2022-09-13 NOTE — PROCEDURES
[facility] Medical Spine Consultation    Date of visit: 9/13/2022    Assessment:  May Arrieta is a 47 year old female with a past medical history significant for burning mouth syndrome who presents with complaints of:    1. Right sided neck pain with radiation into the right arm: May reports onset of right-sided neck pain with radiation into the right arm and hand started about a year ago.  She was previously seen at [facility] for this issue earlier this year.  She did physical therapy and had a right C6 nerve root injection.  She states that the injection was helpful for 4 to 5 months.  She was able to have more function of her right arm due to the reduced pain.  Now the pain has returned to baseline.  At this time it is fairly constant.  On exam she has reduced cervical range of motion secondary to pain, tenderness in the right trapezius, altered sensation in the right upper extremity particularly the right thumb.  Strength is 5/ 5 in bilateral upper extremities.  She had a cervical MRI done at [facility] prior to her injection.  This is not available for review today.  However in review of progress notes it seems she does have some significant stenosis at the right C5-6 foramen.  Etiology of her symptoms is likely multifactorial secondary to cervical radiculopathy and myofascial pain.    Plan:  The following recommendations were given to the patient. Diagnosis, treatment options, risks, benefits, and alternatives were discussed, and all questions were answered. The patient expressed understanding of the plan for management.     1. Therapies: She will continue with her prior PT exercises.  2. Diagnostic Studies: Release of information form filled out to obtain cervical MRI images and report from [facility].  3. Medication Management:   1. We discussed proper doses of over-the-counter medications.  Discussed with her that she should not be using more than 3000 mg of  Tylenol in a day or over 2400 mg of ibuprofen in a day.  2. She is on gabapentin for burning mouth syndrome.  She takes 100 mg in the morning and 200 mg at night. She may increase the nighttime dose to 300 mg.  4. Procedures recommended: Order placed for a repeat right C6 transforaminal epidural steroid injection since she had significant benefit with it previously  5. Follow up: 2 weeks after injection    Reason for consultation:    Primary Care Provider is Lashell Brambila    May Arrieta is a 47 year old female who was self referred for evaluation of right sided neck pain.      Consultation and Evaluation for: Neck pain    Review of Electronic Chart: Today I have also reviewed available medical information in the patient's medical record at [facility] ([facility]), including relevant provider notes, laboratory work, and imaging.     Chief Complaint:    Chief Complaint   Patient presents with     Pain       History:  May Arrieta is a 47 year old female who presents for initial evaluation of chief pain complaint of right sided neck pain. Pain started about a year ago. No specific inciting event. Came on fairly suddenly. Initially started just in the neck and then over course of few days spread down to the arm and into the hand.  She reports symptoms mainly into the thumb.  She describes the pain as ranging from dull to aching to heavy.  The pain is fairly constant at this point.  It is aggravated with any movement of the arm.  Feels like a lightening bolt in her arm.  She has been seen for this previously at [facility] earlier this year.  She did physical therapy and had a right C6 nerve root injection.  She reports having good benefit from the injection.  She describes having about 4 to 5 months of pain relief.  She was able to have better function of her arm during that time.  The symptoms she is having now are the same as they were previously.    Severity/Intensity: 10/10 at worst, 5/10 at  best  Aggravating factors include: using the arm  Relieving factors include: rest, laying down   Red Flags: Feels some weakness in the hand. Trouble grabbing things. The patient denies bowel or bladder incontinence,unintentional weight loss, or fever/chills/sweats, gait instability.     Medications:       Current pain medications:  Tylenol 1,000 mg q 4 hrs prn - takes about 6 tabs per day  Advil 600-800 mg - 8 tabs per day  Topical with menthol - minimal benefit temporarily  Amitriptyline 10 mg q hs   Bupropion 150 mg BID  Gabapentin 200 mg BID       Past Pain Treatments:  PT: Yes - She did this previously at [facility] and with her chiropractor - ?  TENs Unit: No  Injections: Yes - Right C6 nerve root injection - H for 4-5 months  Self-care:   Yes - ice - [facility]  Surgeries related to pain: None  Alternative Therapies:    Chiropractic: yes - temporary benefit   Acupuncture: No    Diagnostic tests:  No imaging or reports available to review. Per review of notes from [facility] it seems she has significant stenosis at the right C5-6 foramen.     EMG/Testing:  none    Labs:   Creatine 0.84    Past Medical History:  Past Medical History:   Diagnosis Date     Burning mouth syndrome      Multiple environmental allergies        Past Surgical History:  Past Surgical History:   Procedure Laterality Date     ESSURE TUBAL LIGATION  2010     LAPAROSCOPIC CYSTECTOMY OVARIAN (BENIGN)  05/12/2013    Procedure: LAPAROSCOPIC CYSTECTOMY OVARIAN (BENIGN);  Diagnostic laparoscopy, left ovarian cystectomy ;  Surgeon: Ignacia Madrid DO;  Location:  OR     LAPAROSCOPY DIAGNOSTIC (GYN)  05/12/2013    Procedure: LAPAROSCOPY DIAGNOSTIC (GYN);;  Surgeon: Ignacia Madrid DO;  Location:  OR       Medications:  Current Outpatient Medications   Medication Sig Dispense Refill     amitriptyline (ELAVIL) 10 MG tablet Take 1 tablet (10 mg) by mouth At Bedtime 90 tablet 3     buPROPion (WELLBUTRIN SR) 150 MG 12 hr tablet Take 1  tablet (150 mg) by mouth 2 times daily 180 tablet 3     cetirizine (ZYRTEC) 10 MG tablet Take 1 tablet (10 mg) by mouth every evening 90 tablet 3     fluticasone (FLONASE) 50 MCG/ACT nasal spray Spray 1-2 sprays into both nostrils daily 16 g 3     gabapentin (NEURONTIN) 100 MG capsule Take 2 capsules (200 mg) by mouth 2 times daily 360 capsule 3     MELATONIN PO        nirmatrelvir and ritonavir (PAXLOVID) therapy pack Take 3 tablets by mouth 2 times daily 30 tablet 0     omeprazole (PRILOSEC) 40 MG DR capsule Take 1 capsule (40 mg) by mouth daily 90 capsule 3       Allergies:     Allergies   Allergen Reactions     Doxycycline Nausea     Penicillins Headache and Nausea and Vomiting       Family history:  Family History   Problem Relation Age of Onset     Depression Mother      Hypertension Mother      Diabetes Type 2  Mother      Post-Traumatic Stress Disorder (PTSD) Father      Colon Cancer Maternal Grandmother      Myocardial Infarction Maternal Grandfather      Breast Cancer Paternal Grandmother      Diabetes Type 2  Paternal Grandmother      Diabetes Type 2  Paternal Grandfather      Myocardial Infarction Paternal Grandfather      Cerebrovascular Disease No family hx of      Coronary Artery Disease Early Onset No family hx of      Ovarian Cancer No family hx of        Social History:  Home situation: Lives in [facility]  Occupation/Schoolin jobs - cleans office building, coordinator for a monthly event, works at [employer], 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  HEENT: Head atraumatic, normocephalic. Eyes without conjunctival injection or jaundice. Neck supple. No obvious neck masses.  Respiratory: unlabored breathing on room air  Skin:  No rash, lesions of exposed skin. a.  Psychiatric/mental status: Alert, without lethargy or stupor. Speech fluent. Appropriate affect. Mood normal. Able to follow commands without difficulty.     Musculoskeletal exam:  Gait/Station/Posture:   Normal stance, arm swing, and stride; no antalgia or Trendelenburg  Normal bulk and tone. Unremarkable spinal curvature.     Cervical spine:  Range of motion within normal limits but painful   Myofascial tenderness: present in right trapezius  No focal spinous process tenderness.   Spurling's negative bilaterally. She does have pain at the base of the neck with ROM    Neurologic exam:  CN:  Cranial nerves 2-12 are grossly intact  Motor Strength:  5/5 symmetric UE and LE strength    Reflexes:     Biceps C5:     R:  2/4 L: 2/4   Brachioradialis  C6: R:  2/4 L: 2/4   Triceps C7:  R:  2/4 L: 2/4     Sensory:  (upper extremities):   Light touch: altered on right   Allodynia: absent    Hyperalgesia: absent     Erin Mari MD  [facility] Pain Management       BILLING TIME DOCUMENTATION:   The total TIME spent on this patient on the date of the encounter/appointment was 40 minutes.      TOTAL TIME includes:   Time spent preparing to see the patient (reviewing records and tests)   Time spent face to face (or over the phone) with the patient   Time spent ordering tests, medications, procedures and referrals   Time spent documenting clinical information in Epic

## 2022-09-13 NOTE — NURSING NOTE
PEG Score 9/13/2022   PEG Total Score 10         Kenzie Reno MA  [facility] Pain Management [place]

## 2022-09-16 ENCOUNTER — OFFICE VISIT (OUTPATIENT)
Dept: PALLIATIVE MEDICINE | Facility: CLINIC | Age: 47
End: 2022-09-16
Payer: COMMERCIAL

## 2022-09-16 VITALS
DIASTOLIC BLOOD PRESSURE: 104 MMHG | OXYGEN SATURATION: 99 % | SYSTOLIC BLOOD PRESSURE: 145 MMHG | RESPIRATION RATE: 18 BRPM | TEMPERATURE: 98.5 F | HEART RATE: 109 BPM

## 2022-09-16 VITALS — SYSTOLIC BLOOD PRESSURE: 141 MMHG | OXYGEN SATURATION: 98 % | HEART RATE: 102 BPM | DIASTOLIC BLOOD PRESSURE: 89 MMHG

## 2022-09-16 DIAGNOSIS — M54.12 CERVICAL RADICULOPATHY: Primary | ICD-10-CM

## 2022-09-16 DIAGNOSIS — M79.18 MYOFASCIAL PAIN: ICD-10-CM

## 2022-09-16 PROCEDURE — 99213 OFFICE O/P EST LOW 20 MIN: CPT | Performed by: PHYSICAL MEDICINE & REHABILITATION

## 2022-09-16 PROCEDURE — 99283 EMERGENCY DEPT VISIT LOW MDM: CPT

## 2022-09-16 RX ORDER — METHOCARBAMOL 500 MG/1
500 TABLET, FILM COATED ORAL 4 TIMES DAILY PRN
Qty: 30 TABLET | Refills: 1 | Status: SHIPPED | OUTPATIENT
Start: 2022-09-16 | End: 2024-02-21

## 2022-09-16 RX ORDER — OXYCODONE AND ACETAMINOPHEN 5; 325 MG/1; MG/1
1 TABLET ORAL EVERY 4 HOURS PRN
Qty: 12 TABLET | Refills: 0 | Status: SHIPPED | OUTPATIENT
Start: 2022-09-16 | End: 2022-09-17

## 2022-09-16 ASSESSMENT — PAIN SCALES - GENERAL: PAINLEVEL: WORST PAIN (10)

## 2022-09-16 NOTE — NURSING NOTE
Patient presents to the clinic today for a follow up with Erin Mari MD  regarding Pain Management.     Neck pain. Shoulder pain. Electrical feeling. Heavy arm feeling. Extreme pain. Woke up early 1 am from the pain. Have not been back to sleep. Can't lay down. Can't sit down on the couch. Sitting in the office chairs helps. Walking helps. Shower, Asprin, heat, cold pain, pillows, multiple ways and nothing is relieving the pain.       Maria Fernanda Owens MA  [facility] Pain Management [place]

## 2022-09-16 NOTE — PATIENT INSTRUCTIONS
Start percocet 5-325 mg tablets every 4 hours as needed, max #3 tabs per day.   Start methocarbamol 500 mg four times daily as needed. Can be sedating.  Do not drive until you know how the medication affects you.  Complete course of oral steroid.  Order placed for an updated cervical MRI. You can call the imaging department to schedule. The number is [phone].  Referral placed for Neurosurgery to discuss surgical options for your pain.   Follow up with me 2 weeks after the injection.   If you are unable to manage the pain at home or you have worsening symptoms (especially if you develop weakness in the arm) go the the Emergency Department.     Erin Mari MD  [facility] Pain Management     ----------------------------------------------------------------  Clinic Number:  [phone]   Call with any questions about your care and for scheduling assistance.   Calls are returned Monday through Friday between 8 AM and 4:30 PM. We usually get back to you within 2 business days depending on the issue/request.    If we are prescribing your medications:  For opioid medication refills, call the clinic or send a OpenTable message 7 days in advance.  Please include:  Name of requested medication  Name of the pharmacy.  For non-opioid medications, call your pharmacy directly to request a refill. Please allow 3-4 days to be processed.   Per [place] State Law:  All controlled substance prescriptions must be filled within 30 days of being written.    For those controlled substances allowing refills, pickup must occur within 30 days of last fill.      We believe regular attendance is key to your success in our program!    Any time you are unable to keep your appointment we ask that you call us at least 24 hours in advance to cancel.This will allow us to offer the appointment time to another patient.   Multiple missed appointments may lead to dismissal from the clinic.

## 2022-09-16 NOTE — PROCEDURES
[facility] Follow Up     Date of visit: 9/16/2022    Last seen by me on 9/13/2022.      Assessment:  May Arrieta is a 47 year old female with a past medical history significant for burning mouth syndrome who presents with complaints of:     1. Right sided neck pain with radiation into the right arm: May reports onset of right-sided neck pain with radiation into the right arm and hand started about a year ago.  She was previously seen at [facility] for this issue earlier this year.  She did physical therapy and had a right C6 nerve root injection.  She states that the injection was helpful for 4 to 5 months.  She was able to have more function of her right arm due to the reduced pain.  Now the pain has returned to baseline.  At this time it is fairly constant.  On exam she has reduced cervical range of motion secondary to pain, tenderness in the right trapezius, altered sensation in the right upper extremity particularly the right thumb.  Strength is 5/ 5 in bilateral upper extremities.  She had a cervical MRI done at [facility] prior to her injection.  This is not available for review today.  However in review of progress notes it seems she does have some significant stenosis at the right C5-6 foramen.  Etiology of her symptoms is likely multifactorial secondary to cervical radiculopathy and myofascial pain      Plan:    1. Therapies: She will continue with her prior PT exercises.  2. Diagnostic Studies: Release of information form filled out to obtain cervical MRI images and report from [facility].  3. Medication Management:   1. We discussed proper doses of over-the-counter medications.  Discussed with her that she should not be using more than 3000 mg of Tylenol in a day or over 2400 mg of ibuprofen in a day.  2. She is on gabapentin for burning mouth syndrome.  She takes 100 mg in the morning and 200 mg at night. She may increase the nighttime  dose to 300 mg.  4. Procedures recommended: Order placed for a repeat right C6 transforaminal epidural steroid injection since she had significant benefit with it previously  5. Follow up: 2 weeks after injection      Chief complaint:   Chief Complaint   Patient presents with     Pain     Neck pain. Shoulder pain. Electrical feeling. Heavy arm feeling. Extreme pain.        Since her last visit, May Arrieta reports:  - woke up at 1 am with severe increase in pain in neck and rigth shoulder. Arm is heavy. Hard to lay down.   - waiting on scheduling injection  -     Pain scores:  Pain intensity on average is {NUMBERS 0-10:676396} on a scale of 0-10.     Medications:       Current pain medications:  Tylenol 1,000 mg q 4 hrs prn - takes about 6 tabs per day  Advil 600-800 mg - 8 tabs per day  Topical with menthol - minimal benefit temporarily  Amitriptyline 10 mg q hs   Bupropion 150 mg BID   Gabapentin 200 mg BID          Other relevant medications:    Current calculated MME:     Past pain treatments:  PT: Yes - She did this previously at [place] and with her chiropractor - ?  TENs Unit: No  Injections: Yes - Right C6 nerve root injection - H for 4-5 months  Self-care:   Yes - ice - [facility]  Surgeries related to pain: None  Alternative Therapies:               Chiropractic: yes - temporary benefit              Acupuncture: No       Medications:  Current Outpatient Medications   Medication Sig Dispense Refill     amitriptyline (ELAVIL) 10 MG tablet Take 1 tablet (10 mg) by mouth At Bedtime 90 tablet 3     buPROPion (WELLBUTRIN SR) 150 MG 12 hr tablet Take 1 tablet (150 mg) by mouth 2 times daily 180 tablet 3     cetirizine (ZYRTEC) 10 MG tablet Take 1 tablet (10 mg) by mouth every evening 90 tablet 3     fluticasone (FLONASE) 50 MCG/ACT nasal spray Spray 1-2 sprays into both nostrils daily 16 g 3     gabapentin (NEURONTIN) 100 MG capsule Take 2 capsules (200 mg) by mouth 2 times daily 360 capsule 3     MELATONIN PO     "    methylPREDNISolone (MEDROL DOSEPAK) 4 MG tablet therapy pack Follow Package Directions 21 tablet 0     omeprazole (PRILOSEC) 40 MG DR capsule Take 1 capsule (40 mg) by mouth daily 90 capsule 3       Medical History: any changes in medical history since they were last seen? { :179515::\"No\"}    Review of Systems:  The 14 system ROS was reviewed from the intake questionnaire, and is positive for: ***  Any bowel or bladder problems: ***  Mood: ***    Diagnostic tests:  No imaging or reports available to review. Per review of notes from [facility] it seems she has significant stenosis at the right C5-6 foramen.      EMG/Testing:      Labs:   ***    CSA and UDS due -       Physical Exam:  Blood pressure (!) 141/89, pulse 102, SpO2 98 %, not currently breastfeeding.  General: ***  Gait: {GAIT:183743}  MSK exam: ***    Erin Mari MD  [facility]     BILLING TIME DOCUMENTATION:   The total TIME spent on this patient on the date of the encounter/appointment was *** minutes.      TOTAL TIME includes:   Time spent preparing to see the patient (reviewing records and tests) - *** min  Time spent face to face (or over the phone) with the patient - *** min  Time spent ordering tests, medications, procedures and referrals - *** min  Time spent Referring and communicating with other healthcare professionals - *** min  Time spent documenting clinical information in Epic - *** min                  "

## 2022-09-17 ENCOUNTER — HOSPITAL ENCOUNTER (EMERGENCY)
Facility: CLINIC | Age: 47
Discharge: HOME | End: 2022-09-17
Attending: EMERGENCY MEDICINE | Admitting: EMERGENCY MEDICINE
Payer: COMMERCIAL

## 2022-09-17 DIAGNOSIS — M54.12 CERVICAL RADICULOPATHY: ICD-10-CM

## 2022-09-17 PROCEDURE — 250N000013 HC RX MED GY IP 250 OP 250 PS 637: Performed by: EMERGENCY MEDICINE

## 2022-09-17 RX ORDER — HYDROXYZINE HYDROCHLORIDE 25 MG/1
25-50 TABLET, FILM COATED ORAL EVERY 8 HOURS PRN
Qty: 20 TABLET | Refills: 0 | Status: SHIPPED | OUTPATIENT
Start: 2022-09-17 | End: 2024-02-21

## 2022-09-17 RX ORDER — HYDROXYZINE HYDROCHLORIDE 25 MG/1
50 TABLET, FILM COATED ORAL ONCE
Status: COMPLETED | OUTPATIENT
Start: 2022-09-17 | End: 2022-09-17

## 2022-09-17 RX ORDER — OXYCODONE AND ACETAMINOPHEN 5; 325 MG/1; MG/1
1 TABLET ORAL EVERY 6 HOURS PRN
COMMUNITY
End: 2023-03-30

## 2022-09-17 RX ADMIN — HYDROXYZINE HYDROCHLORIDE 50 MG: 25 TABLET ORAL at 01:22

## 2022-09-17 ASSESSMENT — ENCOUNTER SYMPTOMS: MYALGIAS: 1

## 2022-09-17 ASSESSMENT — ACTIVITIES OF DAILY LIVING (ADL): ADLS_ACUITY_SCORE: 33

## 2022-09-17 NOTE — ED PROVIDER NOTES
History     Chief Complaint:  Right arm pain     HPI   May Arrieta is a 47 year old female with history of cervical radiculopathy who presents with persistent worsening right arm pain. She took oxycodone and muscle relaxers without relief. She has also received 3 days of steroids. She notes shooting pain in her right arm which keeps her awake yesterday but denies new falls and trauma. Patient was seen by pain clinic on 13th and 16th of this month.  She had a steroid injection last winter that provided relief for 5-6 months.  She is working with a pain clinic currently.     Review of Systems   Musculoskeletal: Positive for myalgias.   All other systems reviewed and are negative.    Allergies:  Doxycycline  Penicillins  Albuterol     Medications:  Elavil   Wellbutrin   Zyrtec  Gabapentin  Prilosec  Methocarbamol   Medrol dose pack - day 3  Oxycodone    Past Medical History:     Burning mouth syndrome   Right cervical radiculopathy  Right lumbar radiculopathy    Past Surgical History:    Tubal ligation  Ovarian cystectomy laparoscopic  ENDOMETRIAL ABLATION    Family History:    Mother: depression, hypertension, Type 2 diabetes mellitus   Father: PTSD    Social History:  The patient is accompanied   PCP: Lashell Brambila    Physical Exam     Patient Vitals for the past 24 hrs:   BP Temp Temp src Pulse Resp SpO2   09/16/22 2127 145/104 98.5  F (36.9  C) Temporal 109 18 99 %     Physical Exam  Nursing note and vitals reviewed.  Constitutional: Cooperative. appears uncomfortable.  HENT:   Mouth/Throat: Mucous membranes are normal.   Cardiovascular: Normal rate, regular rhythm and normal heart sounds.  No murmur.  Pulmonary/Chest: Effort normal and breath sounds normal. No respiratory distress. No wheezes. No rales.   Musculoskeletal:  Full passive range of motion in right upper extremity. Normal strength in right axillary, median, radial, and ulnar nerve distribution.  Neurological: Alert. See above in MS exam.    Skin: Skin is warm and dry. No rash noted.   Psychiatric: Normal mood and affect.     Emergency Department Course     Reviewed:  I reviewed nursing notes, vitals, past medical history and Care Everywhere    Assessments:  0108 I obtained history and examined the patient as noted above.     Interventions:  0122 Atarax, 50 mg, oral    Disposition:  The patient was discharged to home.     Impression & Plan   Medical Decision Making:   May Arrieta is a 47 year old female with known right cervical radiculopathy per a pervious MRI which was reviewed. She has burning pain radiating to the C5 C6 distribution by clinical exam and history. No falls or trauma. No indication for repeat MRI imaging at this time. Patient is already on a fair amount of medications including opioids, corticosteroid, gabapentin, etc. Will try adding atarax to see if this adjunct will assist with her pain relief. She will be placed in a sling for immobilization. She will follow up with pain clinic and spine clinic as already referred.      Diagnosis:    ICD-10-CM    1. Cervical radiculopathy - right C5/6  M54.12        Discharge Medications:  New Prescriptions    HYDROXYZINE (ATARAX) 25 MG TABLET    Take 1-2 tablets (25-50 mg) by mouth every 8 hours as needed for other (pain)       Scribe Disclosure:  I, Sebastien Villalta, am serving as a scribe at 12:59 AM on 9/17/2022 to document services personally performed by Nabeel Gil MD based on my observations and the provider's statements to me.         Nabeel Gil MD  09/17/22 3538

## 2022-09-17 NOTE — ED NOTES
Agree with triage- patient states that she has 'tender' pain behind right shoulder above the scapula, radiates around the front and is 'cramping' pain, as it moves down the RUE it is aching and RLE is numb/tingly. Patient denies any trauma. Patient states she had a neck injection in April but that was localized neck pain, no s/s to RUE.

## 2022-09-17 NOTE — ED TRIAGE NOTES
"Pt states neck pain in April. Pt now having atraumatic right arm pain and intermittent numbness down to fingertips. Pt states she went to \"pain doctor\" today. 2 pain pills and muscle relaxers with no relief. Oxy last at 1700.       "

## 2022-09-19 ENCOUNTER — MYC MEDICAL ADVICE (OUTPATIENT)
Dept: PALLIATIVE MEDICINE | Facility: CLINIC | Age: 47
End: 2022-09-19

## 2022-09-19 ENCOUNTER — TELEPHONE (OUTPATIENT)
Dept: PALLIATIVE MEDICINE | Facility: CLINIC | Age: 47
End: 2022-09-19

## 2022-09-19 NOTE — TELEPHONE ENCOUNTER
[facility] Call Center    Phone Message    May a detailed message be left on voicemail: yes     Reason for Call: Other: Patient is in severe pain and would like to discuss with a nurse on what to do. Please call and discuss.     Action Taken: Other: Pain    Travel Screening: Not Applicable

## 2022-09-19 NOTE — TELEPHONE ENCOUNTER
This concern is being handled in calls - closing     Deirdre BAKER RN Care Coordinator  [facility]

## 2022-09-19 NOTE — TELEPHONE ENCOUNTER
LVM x1 to discuss. Awaiting return call.  Following copied from pt's my Chart message sent this am as well:    next step  9/19/2022 9:02 AM Reply    To: PAIN NURSE      From: May Arrieta      Created: 9/19/2022 9:02 AM        *-*-*This message has not been handled.*-*-*    Hello, I saw you on Tuesday and Friday of last week, I did end up going to the ER on Saturday, this pain is unbearable at times and I either cant feel my right arm or there is so much pain it I still cant use it.  II have pain in my neck which feels very tight, it radiates down my neck to my middle back and either puts a big throb in my arm or makes it completly numb..  Abby had very little sleep maybe a couple hours each night if even.  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  If you can please send a mesage as soon as you can for my next steps that can happen sooner than later.  Thanks     Daughter typed,  thanks          Deirdre BAKER RN Care Coordinator  [facility]

## 2022-09-23 ENCOUNTER — TRANSFERRED RECORDS (OUTPATIENT)
Dept: HEALTH INFORMATION MANAGEMENT | Facility: CLINIC | Age: 47
End: 2022-09-23

## 2022-09-26 NOTE — TELEPHONE ENCOUNTER
No call x1 week from pt    Closing    Deirdre BAKER RN Care Coordinator  [facility]

## 2022-09-29 ENCOUNTER — TRANSFERRED RECORDS (OUTPATIENT)
Dept: HEALTH INFORMATION MANAGEMENT | Facility: CLINIC | Age: 47
End: 2022-09-29

## 2022-10-03 DIAGNOSIS — M54.12 CERVICAL RADICULOPATHY: Primary | ICD-10-CM

## 2022-10-03 RX ORDER — LIDOCAINE 40 MG/G
CREAM TOPICAL
Status: CANCELLED | OUTPATIENT
Start: 2022-10-03

## 2022-10-04 ENCOUNTER — TELEPHONE (OUTPATIENT)
Dept: PALLIATIVE MEDICINE | Facility: CLINIC | Age: 47
End: 2022-10-04

## 2022-10-04 NOTE — TELEPHONE ENCOUNTER
Called patient and LVM to schedule injection with Dr. Logan. Provided direct call back number to writer - [phone].

## 2022-10-07 ENCOUNTER — TELEPHONE (OUTPATIENT)
Dept: NEUROSURGERY | Facility: CLINIC | Age: 47
End: 2022-10-07

## 2022-10-07 NOTE — TELEPHONE ENCOUNTER
Called patient and she let me know she went to [place] to get her injection and it is already completed. Writer will void orders.

## 2022-10-07 NOTE — TELEPHONE ENCOUNTER
Spoke with patient regarding referral from workqueue. Patient declined services- receiving care from [place].

## 2022-10-11 ENCOUNTER — TRANSFERRED RECORDS (OUTPATIENT)
Dept: HEALTH INFORMATION MANAGEMENT | Facility: CLINIC | Age: 47
End: 2022-10-11

## 2022-10-16 ENCOUNTER — HEALTH MAINTENANCE LETTER (OUTPATIENT)
Age: 47
End: 2022-10-16

## 2022-10-21 ENCOUNTER — MYC MEDICAL ADVICE (OUTPATIENT)
Dept: INTERNAL MEDICINE | Facility: CLINIC | Age: 47
End: 2022-10-21

## 2022-10-21 DIAGNOSIS — K21.9 GASTROESOPHAGEAL REFLUX DISEASE WITHOUT ESOPHAGITIS: ICD-10-CM

## 2022-10-24 RX ORDER — OMEPRAZOLE 40 MG/1
40 CAPSULE, DELAYED RELEASE ORAL DAILY
Qty: 30 CAPSULE | Refills: 8 | Status: SHIPPED | OUTPATIENT
Start: 2022-10-24 | End: 2023-03-30

## 2022-10-24 NOTE — TELEPHONE ENCOUNTER
Prescription approved per [facility] Refill Protocol.   30 day request for omeprazole 40 mg DR capsule resent.

## 2022-10-28 ENCOUNTER — OFFICE VISIT (OUTPATIENT)
Dept: INTERNAL MEDICINE | Facility: CLINIC | Age: 47
End: 2022-10-28
Payer: COMMERCIAL

## 2022-10-28 VITALS
OXYGEN SATURATION: 99 % | DIASTOLIC BLOOD PRESSURE: 84 MMHG | WEIGHT: 197.4 LBS | HEIGHT: 68 IN | BODY MASS INDEX: 29.92 KG/M2 | HEART RATE: 88 BPM | TEMPERATURE: 98.7 F | SYSTOLIC BLOOD PRESSURE: 118 MMHG

## 2022-10-28 DIAGNOSIS — M54.12 CERVICAL RADICULOPATHY: ICD-10-CM

## 2022-10-28 DIAGNOSIS — Z01.818 PREOPERATIVE EXAMINATION: Primary | ICD-10-CM

## 2022-10-28 LAB
ALBUMIN SERPL-MCNC: 3.8 G/DL (ref 3.4–5)
ALBUMIN UR-MCNC: NEGATIVE MG/DL
ALP SERPL-CCNC: 67 U/L (ref 40–150)
ALT SERPL W P-5'-P-CCNC: 60 U/L (ref 0–50)
ANION GAP SERPL CALCULATED.3IONS-SCNC: 4 MMOL/L (ref 3–14)
APPEARANCE UR: CLEAR
AST SERPL W P-5'-P-CCNC: 34 U/L (ref 0–45)
BACTERIA #/AREA URNS HPF: ABNORMAL /HPF
BILIRUB SERPL-MCNC: 0.9 MG/DL (ref 0.2–1.3)
BILIRUB UR QL STRIP: NEGATIVE
BUN SERPL-MCNC: 10 MG/DL (ref 7–30)
CALCIUM SERPL-MCNC: 9.1 MG/DL (ref 8.5–10.1)
CHLORIDE BLD-SCNC: 109 MMOL/L (ref 94–109)
CO2 SERPL-SCNC: 26 MMOL/L (ref 20–32)
COLOR UR AUTO: YELLOW
CREAT SERPL-MCNC: 0.79 MG/DL (ref 0.52–1.04)
ERYTHROCYTE [DISTWIDTH] IN BLOOD BY AUTOMATED COUNT: 12.8 % (ref 10–15)
GFR SERPL CREATININE-BSD FRML MDRD: >90 ML/MIN/1.73M2
GLUCOSE BLD-MCNC: 97 MG/DL (ref 70–99)
GLUCOSE UR STRIP-MCNC: NEGATIVE MG/DL
HCG UR QL: NEGATIVE
HCT VFR BLD AUTO: 43.2 % (ref 35–47)
HGB BLD-MCNC: 13.8 G/DL (ref 11.7–15.7)
HGB UR QL STRIP: ABNORMAL
KETONES UR STRIP-MCNC: NEGATIVE MG/DL
LEUKOCYTE ESTERASE UR QL STRIP: ABNORMAL
MCH RBC QN AUTO: 31.4 PG (ref 26.5–33)
MCHC RBC AUTO-ENTMCNC: 31.9 G/DL (ref 31.5–36.5)
MCV RBC AUTO: 98 FL (ref 78–100)
NITRATE UR QL: NEGATIVE
PH UR STRIP: 5.5 [PH] (ref 5–7)
PLATELET # BLD AUTO: 363 10E3/UL (ref 150–450)
POTASSIUM BLD-SCNC: 4.4 MMOL/L (ref 3.4–5.3)
PROT SERPL-MCNC: 7.2 G/DL (ref 6.8–8.8)
RBC # BLD AUTO: 4.4 10E6/UL (ref 3.8–5.2)
RBC #/AREA URNS AUTO: ABNORMAL /HPF
SODIUM SERPL-SCNC: 139 MMOL/L (ref 133–144)
SP GR UR STRIP: 1.01 (ref 1–1.03)
UROBILINOGEN UR STRIP-ACNC: 0.2 E.U./DL
WBC # BLD AUTO: 9.9 10E3/UL (ref 4–11)
WBC #/AREA URNS AUTO: ABNORMAL /HPF

## 2022-10-28 PROCEDURE — 80053 COMPREHEN METABOLIC PANEL: CPT | Performed by: INTERNAL MEDICINE

## 2022-10-28 PROCEDURE — 99214 OFFICE O/P EST MOD 30 MIN: CPT | Performed by: INTERNAL MEDICINE

## 2022-10-28 PROCEDURE — 36415 COLL VENOUS BLD VENIPUNCTURE: CPT | Performed by: INTERNAL MEDICINE

## 2022-10-28 PROCEDURE — 81001 URINALYSIS AUTO W/SCOPE: CPT | Performed by: INTERNAL MEDICINE

## 2022-10-28 PROCEDURE — 85027 COMPLETE CBC AUTOMATED: CPT | Performed by: INTERNAL MEDICINE

## 2022-10-28 PROCEDURE — 81025 URINE PREGNANCY TEST: CPT | Performed by: INTERNAL MEDICINE

## 2022-10-28 NOTE — PROGRESS NOTES
ASSESSMENT/PLAN:                                                      May Arrieta is a pleasant 47 year old female who presents for a preoperative evaluation. She is undergoing an intermediate risk procedure. Her risk of major cardiac event is 0 points: 0.4%.    (Z01.818) Preoperative examination  (primary encounter diagnosis)  (M54.12) Cervical radiculopathy  Plan: CBC, CMP, UA reflex, and urine pregnancy test today; no additional evaluation, cardiac or otherwise, indicated prior to surgery; may continue all medications up to and on the morning of surgery.     RECOMMEND PROCEEDING WITH SURGERY: YES    Lashell Brambila MD   [address] [address]  T: [phone], F: [phone]    SUBJECTIVE:                                                      May Arrieta is a very pleasant 47 year old female who presents for preoperative evaluation.    Surgeon: Travon   Date: 11/1/2022  Location: [facility], Fax#[phone]  Surgery: unknown cervical spine surgery - ?decompression with possible fusion (intermediate risk)  Indication: cervical radiculopathy    Past Medical History:   Diagnosis Date     Burning mouth syndrome      Multiple environmental allergies      Personal or family history of excessive or prolonged bleeding? No  Personal or family history of blood clots? No  History of snoring/Sleep Apnea? YES - snores but has never been tested for JW  History of blood transfusions? No    Cardiovascular risk: None (0 points)    Risk of major cardiac event: 0 points: 0.4%    Past Surgical History:   Procedure Laterality Date     ESSURE TUBAL LIGATION  2010     LAPAROSCOPIC CYSTECTOMY OVARIAN (BENIGN)  05/12/2013    Procedure: LAPAROSCOPIC CYSTECTOMY OVARIAN (BENIGN);  Diagnostic laparoscopy, left ovarian cystectomy ;  Surgeon: Ignacia Madrid DO;  Location:  OR     LAPAROSCOPY DIAGNOSTIC (GYN)  05/12/2013    Procedure: LAPAROSCOPY  DIAGNOSTIC (GYN);;  Surgeon: Ignacia Madrid DO;  Location: [facility] OR     Artificial assistive devices, such as pacemakers, artificial cardiac valves, or artificial joints? No    Allergies   Allergen Reactions     Doxycycline Nausea     Penicillins Headache and Nausea and Vomiting     Personal or family history of allergy to anesthesia? No  Allergy to local or topical analgesics? No  Allergy to latex? No  Allergy to adhesives/skin preparations (chlorhexadine)? No    Current Outpatient Medications   Medication Sig     amitriptyline (ELAVIL) 10 MG tablet Take 1 tablet (10 mg) by mouth At Bedtime     buPROPion (WELLBUTRIN SR) 150 MG 12 hr tablet Take 1 tablet (150 mg) by mouth 2 times daily     cetirizine (ZYRTEC) 10 MG tablet Take 1 tablet (10 mg) by mouth every evening     fluticasone (FLONASE) 50 MCG/ACT nasal spray Spray 1-2 sprays into both nostrils daily     gabapentin (NEURONTIN) 100 MG capsule Take 2 capsules (200 mg) by mouth 2 times daily     hydrOXYzine (ATARAX) 25 MG tablet Take 1-2 tablets (25-50 mg) by mouth every 8 hours as needed for other (pain)     MELATONIN PO      methocarbamol (ROBAXIN) 500 MG tablet Take 1 tablet (500 mg) by mouth 4 times daily as needed for muscle spasms     methylPREDNISolone (MEDROL PO)      omeprazole (PRILOSEC) 40 MG DR capsule Take 1 capsule (40 mg) by mouth daily     oxyCODONE-acetaminophen (PERCOCET) 5-325 MG tablet Take 1 tablet by mouth every 6 hours as needed for severe pain     Over the counter medications? Other than above, no  Vitamin Supplements? No  Herbal Supplements? Other than above, no    Family History   Problem Relation Age of Onset     Depression Mother      Hypertension Mother      Diabetes Type 2  Mother      Post-Traumatic Stress Disorder (PTSD) Father      Colon Cancer Maternal Grandmother      Myocardial Infarction Maternal Grandfather      Breast Cancer Paternal Grandmother      Diabetes Type 2  Paternal Grandmother      Diabetes Type 2  Paternal  "Grandfather      Myocardial Infarction Paternal Grandfather      Cerebrovascular Disease No family hx of      Coronary Artery Disease Early Onset No family hx of      Ovarian Cancer No family hx of      Social History     Occupational History     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    2 adult children.    4 grandchildren.    Very active jobs; no formal exercise.     Functional capacity: Can do heavy work around the house like scrubbing floors or moving heavy furniture (7 METs)    OBJECTIVE:                                                      /84   Pulse 88   Temp 98.7  F (37.1  C) (Temporal)   Ht 1.715 m (5' 7.5\")   Wt 89.5 kg (197 lb 6.4 oz)   SpO2 99%   BMI 30.46 kg/m    Constitutional: well-appearing  Respiratory: normal respiratory effort; clear to auscultation bilaterally  Cardiovascular: regular rate and rhythm; no edema  Gastrointestinal: soft, non-tender, non-distended, and bowel sounds present; no organomegaly or masses  Musculoskeletal: normal gait and station  Psych: normal judgment and insight; normal mood and affect    ---    (Chart documentation was completed, in part, with [employer] voice-recognition software. Even though reviewed, some grammatical, spelling, and word errors may remain.)  "

## 2022-10-28 NOTE — PATIENT INSTRUCTIONS
Labs and urine today.    On the morning of surgery, please take omeprazole, Wellbutrin, and gabapentin with a sip of water.    We will fax H&P to surgery center.

## 2022-11-01 ENCOUNTER — TRANSFERRED RECORDS (OUTPATIENT)
Dept: HEALTH INFORMATION MANAGEMENT | Facility: CLINIC | Age: 47
End: 2022-11-01

## 2022-11-15 ENCOUNTER — TRANSFERRED RECORDS (OUTPATIENT)
Dept: HEALTH INFORMATION MANAGEMENT | Facility: CLINIC | Age: 47
End: 2022-11-15

## 2022-12-13 NOTE — PROGRESS NOTES
[facility] Follow Up     Date of visit: 9/16/2022    Last seen by me on 9/13/2022.      Assessment:  May Arrieta is a 47 year old female with a past medical history significant for burning mouth syndrome who presents with complaints of:     1. Right sided neck pain with radiation into the right arm: May reports onset of right-sided neck pain with radiation into the right arm and hand started about a year ago.  She was previously seen at [facility] for this issue earlier this year.  She did physical therapy and had a right C6 nerve root injection.  She states that the injection was helpful for 4 to 5 months.  She was able to have more function of her right arm due to the reduced pain.  Now the pain has returned to baseline.  At this time it is fairly constant.  On exam she has reduced cervical range of motion secondary to pain, tenderness in the right trapezius, altered sensation in the right upper extremity particularly the right thumb.  Strength is 5/ 5 in bilateral upper extremities.  She had a cervical MRI done at [facility] prior to her injection.  This is not available for review today.  However in review of progress notes it seems she does have some significant stenosis at the right C5-6 foramen.  Etiology of her symptoms is likely multifactorial secondary to cervical radiculopathy and myofascial pain. She presents to clinic today with worsened pain.       Plan:    1. Therapies: She will continue with her prior PT exercises.  2. Diagnostic Studies: order placed for an updated cervical MRI due to worsening of symptoms  3. Medication Management:   1. Due to severity of pain start percocet 5-325 mg q 4 hrs prn, max #3 tabs per day. One time prescription.   2. Start methocarbamol 500 mg QID prn.   3. Complete course of oral steroid  4. Continue gabapentin  4. Procedures recommended: previously placed order for repeat right C6 transforaminal epidural steroid  injection since she had significant benefit with it previously   5. Referral placed for Neurosurgery to discuss potential surgical options for neck pain  6. Follow up: 2 weeks after injection. Discussed if pain is unmanageable at home or she is having worsening symptoms then she should go to the emergency department for evaluation.       Chief complaint:   Chief Complaint   Patient presents with     Pain     Neck pain. Shoulder pain. Electrical feeling. Heavy arm feeling. Extreme pain.        Since her last visit, May Arrieta reports:  - woke up at 1 am with severe increase in pain in neck and right shoulder. Arm is heavy. Hard to lay down.   - waiting on scheduling injection    Pain scores:  Pain intensity on average is 10 on a scale of 0-10.     Medications:       Current pain medications:  Tylenol 1,000 mg q 4 hrs prn - takes about 6 tabs per day  Advil 600-800 mg - 8 tabs per day  Topical with menthol - minimal benefit temporarily  Amitriptyline 10 mg q hs   Bupropion 150 mg BID   Gabapentin 200 mg BID     Past pain treatments:  PT: Yes - She did this previously at [place] and with her chiropractor - ?  TENs Unit: No  Injections: Yes - Right C6 nerve root injection - H for 4-5 months  Self-care:   Yes - ice - [facility]  Surgeries related to pain: None  Alternative Therapies:               Chiropractic: yes - temporary benefit              Acupuncture: No       Medications:  Current Outpatient Medications   Medication Sig Dispense Refill     amitriptyline (ELAVIL) 10 MG tablet Take 1 tablet (10 mg) by mouth At Bedtime 90 tablet 3     buPROPion (WELLBUTRIN SR) 150 MG 12 hr tablet Take 1 tablet (150 mg) by mouth 2 times daily 180 tablet 3     cetirizine (ZYRTEC) 10 MG tablet Take 1 tablet (10 mg) by mouth every evening 90 tablet 3     fluticasone (FLONASE) 50 MCG/ACT nasal spray Spray 1-2 sprays into both nostrils daily 16 g 3     gabapentin (NEURONTIN) 100 MG capsule Take 2 capsules (200 mg) by mouth 2 times daily  360 capsule 3     MELATONIN PO        methylPREDNISolone (MEDROL DOSEPAK) 4 MG tablet therapy pack Follow Package Directions 21 tablet 0     omeprazole (PRILOSEC) 40 MG DR capsule Take 1 capsule (40 mg) by mouth daily 90 capsule 3       Diagnostic tests:  No imaging or reports available to review. Per review of notes from [facility] it seems she has significant stenosis at the right C5-6 foramen.      Physical Exam:  Blood pressure (!) 141/89, pulse 102, SpO2 98 %, not currently breastfeeding.  General: A&O x 3, appears uncomfortable due to pain  Gait: Normal  Neuro: strength 5/5 in JESSICA Mari MD  [facility]     BILLING TIME DOCUMENTATION:   The total TIME spent on this patient on the date of the encounter/appointment was 25minutes.      TOTAL TIME includes:   Time spent preparing to see the patient (reviewing records and tests)   Time spent face to face (or over the phone) with the patient   Time spent ordering tests, medications, procedures and referrals   Time spent documenting clinical information in Epic

## 2022-12-27 ENCOUNTER — TRANSFERRED RECORDS (OUTPATIENT)
Dept: HEALTH INFORMATION MANAGEMENT | Facility: CLINIC | Age: 47
End: 2022-12-27

## 2023-07-11 ENCOUNTER — PATIENT OUTREACH (OUTPATIENT)
Dept: CARE COORDINATION | Facility: CLINIC | Age: 48
End: 2023-07-11
Payer: COMMERCIAL

## 2023-08-08 ENCOUNTER — PATIENT OUTREACH (OUTPATIENT)
Dept: CARE COORDINATION | Facility: CLINIC | Age: 48
End: 2023-08-08
Payer: COMMERCIAL

## 2023-08-10 DIAGNOSIS — J31.0 CHRONIC RHINITIS: ICD-10-CM

## 2023-08-11 RX ORDER — CETIRIZINE HYDROCHLORIDE 10 MG/1
TABLET ORAL
Qty: 90 TABLET | Refills: 0 | Status: SHIPPED | OUTPATIENT
Start: 2023-08-11 | End: 2024-02-22

## 2023-08-26 ENCOUNTER — HEALTH MAINTENANCE LETTER (OUTPATIENT)
Age: 48
End: 2023-08-26

## 2023-09-23 ENCOUNTER — OFFICE VISIT (OUTPATIENT)
Dept: URGENT CARE | Facility: URGENT CARE | Age: 48
End: 2023-09-23
Payer: COMMERCIAL

## 2023-09-23 ENCOUNTER — ANCILLARY PROCEDURE (OUTPATIENT)
Dept: GENERAL RADIOLOGY | Facility: CLINIC | Age: 48
End: 2023-09-23
Attending: FAMILY MEDICINE
Payer: COMMERCIAL

## 2023-09-23 VITALS
HEART RATE: 96 BPM | TEMPERATURE: 98.5 F | DIASTOLIC BLOOD PRESSURE: 85 MMHG | SYSTOLIC BLOOD PRESSURE: 127 MMHG | BODY MASS INDEX: 29.32 KG/M2 | WEIGHT: 190 LBS | OXYGEN SATURATION: 99 % | RESPIRATION RATE: 14 BRPM

## 2023-09-23 DIAGNOSIS — R06.2 WHEEZING: Primary | ICD-10-CM

## 2023-09-23 DIAGNOSIS — Z77.22 PASSIVE SMOKE EXPOSURE: ICD-10-CM

## 2023-09-23 DIAGNOSIS — R06.02 SOB (SHORTNESS OF BREATH): ICD-10-CM

## 2023-09-23 DIAGNOSIS — R05.9 COUGH IN ADULT: ICD-10-CM

## 2023-09-23 PROCEDURE — 94640 AIRWAY INHALATION TREATMENT: CPT | Performed by: FAMILY MEDICINE

## 2023-09-23 PROCEDURE — 71046 X-RAY EXAM CHEST 2 VIEWS: CPT | Mod: TC | Performed by: RADIOLOGY

## 2023-09-23 PROCEDURE — 99214 OFFICE O/P EST MOD 30 MIN: CPT | Mod: 25 | Performed by: FAMILY MEDICINE

## 2023-09-23 RX ORDER — ALBUTEROL SULFATE 90 UG/1
2 AEROSOL, METERED RESPIRATORY (INHALATION) EVERY 6 HOURS PRN
Qty: 18 G | Refills: 0 | Status: SHIPPED | OUTPATIENT
Start: 2023-09-23 | End: 2024-02-21

## 2023-09-23 RX ORDER — BENZONATATE 100 MG/1
100 CAPSULE ORAL 3 TIMES DAILY PRN
Qty: 30 CAPSULE | Refills: 0 | Status: SHIPPED | OUTPATIENT
Start: 2023-09-23 | End: 2023-09-23

## 2023-09-23 RX ORDER — BENZONATATE 100 MG/1
100 CAPSULE ORAL 3 TIMES DAILY PRN
Qty: 30 CAPSULE | Refills: 0 | Status: SHIPPED | OUTPATIENT
Start: 2023-09-23 | End: 2024-02-21

## 2023-09-23 RX ORDER — PREDNISONE 20 MG/1
20 TABLET ORAL 2 TIMES DAILY
Qty: 8 TABLET | Refills: 0 | Status: SHIPPED | OUTPATIENT
Start: 2023-09-24 | End: 2023-09-28

## 2023-09-23 RX ORDER — PREDNISONE 20 MG/1
40 TABLET ORAL ONCE
Status: COMPLETED | OUTPATIENT
Start: 2023-09-23 | End: 2023-09-23

## 2023-09-23 RX ORDER — ALBUTEROL SULFATE 90 UG/1
2 AEROSOL, METERED RESPIRATORY (INHALATION) EVERY 6 HOURS PRN
Qty: 18 G | Refills: 0 | Status: SHIPPED | OUTPATIENT
Start: 2023-09-23 | End: 2023-09-23

## 2023-09-23 RX ORDER — IPRATROPIUM BROMIDE AND ALBUTEROL SULFATE 2.5; .5 MG/3ML; MG/3ML
3 SOLUTION RESPIRATORY (INHALATION) ONCE
Status: COMPLETED | OUTPATIENT
Start: 2023-09-23 | End: 2023-09-23

## 2023-09-23 RX ADMIN — PREDNISONE 20 MG: 20 TABLET ORAL at 16:48

## 2023-09-23 RX ADMIN — IPRATROPIUM BROMIDE AND ALBUTEROL SULFATE 3 ML: 2.5; .5 SOLUTION RESPIRATORY (INHALATION) at 16:54

## 2023-09-23 NOTE — PROGRESS NOTES
Chief Complaint   Patient presents with    Cough     Coughing and heaviness in the chest for the last 2 weeks.        May was seen today for cough.    Diagnoses and all orders for this visit:    Wheezing  -     predniSONE (DELTASONE) tablet 40 mg  -     ipratropium - albuterol 0.5 mg/2.5 mg/3 mL (DUONEB) neb solution 3 mL  -     Discontinue: albuterol (PROAIR HFA/PROVENTIL HFA/VENTOLIN HFA) 108 (90 Base) MCG/ACT inhaler; Inhale 2 puffs into the lungs every 6 hours as needed for shortness of breath, wheezing or cough  -     albuterol (PROAIR HFA/PROVENTIL HFA/VENTOLIN HFA) 108 (90 Base) MCG/ACT inhaler; Inhale 2 puffs into the lungs every 6 hours as needed for shortness of breath, wheezing or cough  -     predniSONE (DELTASONE) 20 MG tablet; Take 1 tablet (20 mg) by mouth 2 times daily for 4 days    Cough in adult  -     XR Chest 2 Views  -     Discontinue: benzonatate (TESSALON) 100 MG capsule; Take 1 capsule (100 mg) by mouth 3 times daily as needed for cough  -     benzonatate (TESSALON) 100 MG capsule; Take 1 capsule (100 mg) by mouth 3 times daily as needed for cough    SOB (shortness of breath)  -     XR Chest 2 Views  -     ipratropium - albuterol 0.5 mg/2.5 mg/3 mL (DUONEB) neb solution 3 mL    Passive smoke exposure        D/d Acute Bronchitis  Acute Sinusitis  Asthma exacerbation  COPD exacerbation  Pneumonia  Upper Respiratory Infection    PLAN:  See orders in Epic  Symptomatic measures encouraged, humidified air, plenty of fluids.  For the neb treatment and prednisone she did feel way better.  Coughing was controlled and her shortness of breath was better.  Chest x-ray was negative for any infiltrate would not treat with any antibiotic continue on the albuterol inhaler every 4-6 hours for wheezing symptoms call in Tessalon Perles which should help with the coughing and also do complete the prednisone course starting tomorrow for next 4 days.  Patient does understand that being exposed to passive  smoking can make things worse and I advised her to work on avoiding it as much as possible.    Results for orders placed or performed in visit on 09/23/23   XR Chest 2 Views     Status: None    Narrative    EXAM: XR CHEST 2 VIEWS  LOCATION: [facility]  DATE: 9/23/2023    INDICATION:  Cough in adult, SOB (shortness of breath)  COMPARISON: 10/27/2014      Impression    IMPRESSION: Negative chest.         SUBJECTIVE:  May Arrieta is a 48 year old female with history of allergies and history of passive smoking who presents to the clinic today with a chief complaint of shortness of breath., wheezing., and productive cough for 2 week(s).  Her cough is described as productive of yellow sputum.    The patient's symptoms are moderate and worsening.  Associated symptoms include shortness of breath and wheezing. The patient's symptoms are exacerbated by no particular triggers  Patient has been using OTC cough suppressants  to improve symptoms.    Past Medical History:   Diagnosis Date    Burning mouth syndrome     Multiple environmental allergies        Current Outpatient Medications   Medication Sig Dispense Refill    albuterol (PROAIR HFA/PROVENTIL HFA/VENTOLIN HFA) 108 (90 Base) MCG/ACT inhaler Inhale 2 puffs into the lungs every 6 hours as needed for shortness of breath, wheezing or cough 18 g 0    amitriptyline (ELAVIL) 10 MG tablet TAKE 1 TABLET(10 MG) BY MOUTH AT BEDTIME 90 tablet 0    benzonatate (TESSALON) 100 MG capsule Take 1 capsule (100 mg) by mouth 3 times daily as needed for cough 30 capsule 0    cetirizine (ZYRTEC) 10 MG tablet TEST 90 tablet 0    omeprazole (PRILOSEC) 40 MG DR capsule Take 1 capsule (40 mg) by mouth daily 90 capsule 3    [START ON 9/24/2023] predniSONE (DELTASONE) 20 MG tablet Take 1 tablet (20 mg) by mouth 2 times daily for 4 days 8 tablet 0    buPROPion (WELLBUTRIN SR) 150 MG 12 hr tablet Take 1 tablet (150 mg) by mouth 2 times daily (Patient not taking:  Reported on 9/23/2023) 180 tablet 3    fluticasone (FLONASE) 50 MCG/ACT nasal spray Spray 1-2 sprays into both nostrils daily (Patient not taking: Reported on 9/23/2023) 16 g 3    gabapentin (NEURONTIN) 100 MG capsule Take 2 capsules (200 mg) by mouth 2 times daily (Patient not taking: Reported on 9/23/2023) 360 capsule 3    hydrOXYzine (ATARAX) 25 MG tablet Take 1-2 tablets (25-50 mg) by mouth every 8 hours as needed for other (pain) (Patient not taking: Reported on 9/23/2023) 20 tablet 0    LORazepam (ATIVAN) 0.5 MG tablet Take 1 tablet (0.5 mg) by mouth daily as needed for anxiety (Patient not taking: Reported on 9/23/2023) 10 tablet 0    MELATONIN PO  (Patient not taking: Reported on 9/23/2023)      methocarbamol (ROBAXIN) 500 MG tablet Take 1 tablet (500 mg) by mouth 4 times daily as needed for muscle spasms (Patient not taking: Reported on 9/23/2023) 30 tablet 1    methylPREDNISolone (MEDROL PO)  (Patient not taking: Reported on 9/23/2023)      scopolamine (TRANSDERM) 1 MG/3DAYS 72 hr patch Place 1 patch onto the skin every 72 hours (Patient not taking: Reported on 9/23/2023) 6 patch 0       Social History     Tobacco Use    Smoking status: Every Day     Packs/day: 0.25     Years: 31.00     Pack years: 7.75     Types: Cigarettes    Smokeless tobacco: Never    Tobacco comments:     3 cigs/day (as of 2022); 31 years (as of 2022)   Substance Use Topics    Alcohol use: Not Currently       ROS  Review of systems negative except as stated above.    OBJECTIVE:  /85 (BP Location: Left arm, Patient Position: Sitting, Cuff Size: Adult Large)   Pulse 96   Temp 98.5  F (36.9  C) (Tympanic)   Resp 14   Wt 86.2 kg (190 lb)   SpO2 99%   BMI 29.32 kg/m    GENERAL APPEARANCE: healthy, alert and mild distress  EYES: EOMI,  PERRL, conjunctiva clear  HENT: ear canals and TM's normal.  Nose congested and mouth without ulcers, erythema or lesions  NECK: supple, nontender, no lymphadenopathy  RESP: lungs  positive for  wheezes bilaterally   CV: regular rates and rhythm, normal S1 S2, no murmur noted  PSYCH: mentation appears normal    Ashley Bernardo MD

## 2023-09-23 NOTE — PATIENT INSTRUCTIONS
Tessalon Perles which should help with the coughing and also do complete the prednisone course starting tomorrow for next 4 days.

## 2023-09-26 ENCOUNTER — TELEPHONE (OUTPATIENT)
Dept: PALLIATIVE MEDICINE | Facility: CLINIC | Age: 48
End: 2023-09-26
Payer: COMMERCIAL

## 2023-11-04 ENCOUNTER — HEALTH MAINTENANCE LETTER (OUTPATIENT)
Age: 48
End: 2023-11-04

## 2023-12-14 DIAGNOSIS — K21.9 GASTROESOPHAGEAL REFLUX DISEASE WITHOUT ESOPHAGITIS: ICD-10-CM

## 2023-12-14 RX ORDER — OMEPRAZOLE 40 MG/1
40 CAPSULE, DELAYED RELEASE ORAL DAILY
Qty: 30 CAPSULE | Refills: 0 | Status: SHIPPED | OUTPATIENT
Start: 2023-12-14 | End: 2024-01-25

## 2023-12-17 ENCOUNTER — MYC REFILL (OUTPATIENT)
Dept: INTERNAL MEDICINE | Facility: CLINIC | Age: 48
End: 2023-12-17
Payer: COMMERCIAL

## 2023-12-17 DIAGNOSIS — K21.9 GASTROESOPHAGEAL REFLUX DISEASE WITHOUT ESOPHAGITIS: ICD-10-CM

## 2023-12-17 DIAGNOSIS — K14.6 BURNING MOUTH SYNDROME: ICD-10-CM

## 2023-12-17 NOTE — LETTER
[facility] [address] [place] [address]  [phone]  December 18, 2023  May Arrieta  [address] [place] [phone]    Dear May,    I am contacting you regarding the refill request we received for you. After reviewing your chart it looks like you are overdue for your annual and for a med check. Please call [phone] to  schedule this to continue to receive refills. If you anticipate running out before your appointment let us know and we can send in a jose refill.       Let them know there is a note in your chart with times to be worked in to be seen.         Thank you,     MAYLIN [facility] nursing staff

## 2023-12-18 RX ORDER — AMITRIPTYLINE HYDROCHLORIDE 10 MG/1
10 TABLET ORAL AT BEDTIME
Qty: 90 TABLET | Refills: 0 | OUTPATIENT
Start: 2023-12-18

## 2023-12-18 RX ORDER — OMEPRAZOLE 40 MG/1
40 CAPSULE, DELAYED RELEASE ORAL DAILY
Qty: 30 CAPSULE | Refills: 0 | OUTPATIENT
Start: 2023-12-18

## 2024-01-24 ENCOUNTER — MYC REFILL (OUTPATIENT)
Dept: INTERNAL MEDICINE | Facility: CLINIC | Age: 49
End: 2024-01-24
Payer: COMMERCIAL

## 2024-01-24 DIAGNOSIS — K21.9 GASTROESOPHAGEAL REFLUX DISEASE WITHOUT ESOPHAGITIS: ICD-10-CM

## 2024-01-24 DIAGNOSIS — K14.6 BURNING MOUTH SYNDROME: ICD-10-CM

## 2024-01-24 RX ORDER — OMEPRAZOLE 40 MG/1
40 CAPSULE, DELAYED RELEASE ORAL DAILY
Qty: 30 CAPSULE | Refills: 0 | OUTPATIENT
Start: 2024-01-24

## 2024-01-24 RX ORDER — AMITRIPTYLINE HYDROCHLORIDE 10 MG/1
10 TABLET ORAL AT BEDTIME
Qty: 90 TABLET | Refills: 0 | OUTPATIENT
Start: 2024-01-24

## 2024-01-25 RX ORDER — OMEPRAZOLE 40 MG/1
40 CAPSULE, DELAYED RELEASE ORAL DAILY
Qty: 90 CAPSULE | Refills: 0 | Status: SHIPPED | OUTPATIENT
Start: 2024-01-25 | End: 2024-02-22

## 2024-01-25 RX ORDER — AMITRIPTYLINE HYDROCHLORIDE 10 MG/1
10 TABLET ORAL AT BEDTIME
Qty: 90 TABLET | Refills: 0 | Status: SHIPPED | OUTPATIENT
Start: 2024-01-25 | End: 2024-02-22

## 2024-01-25 NOTE — TELEPHONE ENCOUNTER
Spoke with patient. Scheduled appointment for 2-22-24 at 11:00-10:40 check-in as authorized by Dr. Brambila. Please send jose refill to pharmacy listed. Thank you, Archana Vides, CMA

## 2024-02-21 SDOH — HEALTH STABILITY: PHYSICAL HEALTH: ON AVERAGE, HOW MANY MINUTES DO YOU ENGAGE IN EXERCISE AT THIS LEVEL?: 30 MIN

## 2024-02-21 SDOH — HEALTH STABILITY: PHYSICAL HEALTH: ON AVERAGE, HOW MANY DAYS PER WEEK DO YOU ENGAGE IN MODERATE TO STRENUOUS EXERCISE (LIKE A BRISK WALK)?: 3 DAYS

## 2024-02-21 ASSESSMENT — SOCIAL DETERMINANTS OF HEALTH (SDOH): HOW OFTEN DO YOU GET TOGETHER WITH FRIENDS OR RELATIVES?: ONCE A WEEK

## 2024-02-22 ENCOUNTER — OFFICE VISIT (OUTPATIENT)
Dept: INTERNAL MEDICINE | Facility: CLINIC | Age: 49
End: 2024-02-22
Payer: COMMERCIAL

## 2024-02-22 VITALS
WEIGHT: 189.2 LBS | HEART RATE: 89 BPM | HEIGHT: 67 IN | TEMPERATURE: 98.5 F | BODY MASS INDEX: 29.7 KG/M2 | SYSTOLIC BLOOD PRESSURE: 120 MMHG | OXYGEN SATURATION: 99 % | DIASTOLIC BLOOD PRESSURE: 82 MMHG

## 2024-02-22 DIAGNOSIS — Z00.00 ROUTINE HISTORY AND PHYSICAL EXAMINATION OF ADULT: Primary | ICD-10-CM

## 2024-02-22 DIAGNOSIS — N92.1 MENORRHAGIA WITH IRREGULAR CYCLE: ICD-10-CM

## 2024-02-22 DIAGNOSIS — Z12.11 SPECIAL SCREENING FOR MALIGNANT NEOPLASMS, COLON: ICD-10-CM

## 2024-02-22 DIAGNOSIS — Z13.1 SCREENING FOR DIABETES MELLITUS: ICD-10-CM

## 2024-02-22 DIAGNOSIS — H81.10 BENIGN PAROXYSMAL POSITIONAL VERTIGO, UNSPECIFIED LATERALITY: ICD-10-CM

## 2024-02-22 DIAGNOSIS — Z12.31 ENCOUNTER FOR SCREENING MAMMOGRAM FOR BREAST CANCER: ICD-10-CM

## 2024-02-22 DIAGNOSIS — K14.6 BURNING MOUTH SYNDROME: ICD-10-CM

## 2024-02-22 DIAGNOSIS — Z13.220 SCREENING FOR CHOLESTEROL LEVEL: ICD-10-CM

## 2024-02-22 DIAGNOSIS — K21.9 GASTROESOPHAGEAL REFLUX DISEASE WITHOUT ESOPHAGITIS: ICD-10-CM

## 2024-02-22 LAB
ALBUMIN SERPL BCG-MCNC: 4.1 G/DL (ref 3.5–5.2)
ALP SERPL-CCNC: 67 U/L (ref 40–150)
ALT SERPL W P-5'-P-CCNC: 33 U/L (ref 0–50)
ANION GAP SERPL CALCULATED.3IONS-SCNC: 9 MMOL/L (ref 7–15)
AST SERPL W P-5'-P-CCNC: 35 U/L (ref 0–45)
BILIRUB SERPL-MCNC: 0.7 MG/DL
BUN SERPL-MCNC: 9.9 MG/DL (ref 6–20)
CALCIUM SERPL-MCNC: 8.9 MG/DL (ref 8.6–10)
CHLORIDE SERPL-SCNC: 105 MMOL/L (ref 98–107)
CHOLEST SERPL-MCNC: 186 MG/DL
CREAT SERPL-MCNC: 0.83 MG/DL (ref 0.51–0.95)
DEPRECATED HCO3 PLAS-SCNC: 25 MMOL/L (ref 22–29)
EGFRCR SERPLBLD CKD-EPI 2021: 87 ML/MIN/1.73M2
ERYTHROCYTE [DISTWIDTH] IN BLOOD BY AUTOMATED COUNT: 12.1 % (ref 10–15)
FASTING STATUS PATIENT QL REPORTED: YES
GLUCOSE SERPL-MCNC: 86 MG/DL (ref 70–99)
HCT VFR BLD AUTO: 42.8 % (ref 35–47)
HDLC SERPL-MCNC: 48 MG/DL
HGB BLD-MCNC: 14.1 G/DL (ref 11.7–15.7)
LDLC SERPL CALC-MCNC: 119 MG/DL
MCH RBC QN AUTO: 31.3 PG (ref 26.5–33)
MCHC RBC AUTO-ENTMCNC: 32.9 G/DL (ref 31.5–36.5)
MCV RBC AUTO: 95 FL (ref 78–100)
NONHDLC SERPL-MCNC: 138 MG/DL
PLATELET # BLD AUTO: 319 10E3/UL (ref 150–450)
POTASSIUM SERPL-SCNC: 4.4 MMOL/L (ref 3.4–5.3)
PROT SERPL-MCNC: 6.8 G/DL (ref 6.4–8.3)
RBC # BLD AUTO: 4.5 10E6/UL (ref 3.8–5.2)
SODIUM SERPL-SCNC: 139 MMOL/L (ref 135–145)
TRIGL SERPL-MCNC: 95 MG/DL
TSH SERPL DL<=0.005 MIU/L-ACNC: 1.08 UIU/ML (ref 0.3–4.2)
WBC # BLD AUTO: 9.6 10E3/UL (ref 4–11)

## 2024-02-22 PROCEDURE — 36415 COLL VENOUS BLD VENIPUNCTURE: CPT | Performed by: INTERNAL MEDICINE

## 2024-02-22 PROCEDURE — 85027 COMPLETE CBC AUTOMATED: CPT | Performed by: INTERNAL MEDICINE

## 2024-02-22 PROCEDURE — 80061 LIPID PANEL: CPT | Performed by: INTERNAL MEDICINE

## 2024-02-22 PROCEDURE — 80053 COMPREHEN METABOLIC PANEL: CPT | Performed by: INTERNAL MEDICINE

## 2024-02-22 PROCEDURE — 99396 PREV VISIT EST AGE 40-64: CPT | Performed by: INTERNAL MEDICINE

## 2024-02-22 PROCEDURE — 84443 ASSAY THYROID STIM HORMONE: CPT | Performed by: INTERNAL MEDICINE

## 2024-02-22 RX ORDER — OMEPRAZOLE 40 MG/1
40 CAPSULE, DELAYED RELEASE ORAL DAILY
Qty: 90 CAPSULE | Refills: 3 | Status: SHIPPED | OUTPATIENT
Start: 2024-02-22

## 2024-02-22 RX ORDER — AMITRIPTYLINE HYDROCHLORIDE 10 MG/1
10 TABLET ORAL AT BEDTIME
Qty: 90 TABLET | Refills: 3 | Status: SHIPPED | OUTPATIENT
Start: 2024-02-22

## 2024-02-22 NOTE — PROGRESS NOTES
ASSESSMENT/PLAN                                                       (Z00.00) Routine history and physical examination of adult  (primary encounter diagnosis)  Comment: PMH, PSH, FH, SH, medications, allergies, immunizations, and preventative health measures reviewed and updated as appropriate.  Plan: see below for plans.      (Z13.220) Screening for cholesterol level  (Z13.1) Screening for diabetes mellitus  Plan: fasting labs today.     (Z12.31) Encounter for screening mammogram for breast cancer  Plan: screening mammogram ordered - patient to schedule.     (Z12.11) Special screening for malignant neoplasms, colon  Plan: FIT test ordered - patient to pick-up, complete, and mail in when able.     (K14.6) Burning mouth syndrome  Comment: well-controlled on current regimen.    Plan: continue present management; refills provided.     (K21.9) Gastroesophageal reflux disease without esophagitis  Comment: well-controlled on current regimen.    Plan: continue present management; refills provided.     (H81.10) Benign paroxysmal positional vertigo, unspecified laterality  Plan: referred for vertigo therapy - patient will be contacted to schedule.      (N92.1) Menorrhagia with irregular cycle  Plan: CBC and TSH reflex today; not a candidate for estrogen containing birth control due to ongoing tobacco use; declines IUD/ob/gyn referral at this time.     Lashell Brambila MD   [address] [address]  T: [phone], F: [phone]    SUBJECTIVE                                                      May Arrieta is a very pleasant 48 year old female who presents for a physical.    Reports heavy and frequent periods.     ROS:  Constitutional: no unintentional weight loss or gain reported; no fevers, chills, or sweats reported  Cardiovascular: no chest pain, palpitations, or edema reported  Respiratory: no cough, wheezing, shortness of breath, or dyspnea on exertion  reported  Gastrointestinal: no nausea, vomiting, constipation, diarrhea, or abdominal pain reported  Genitourinary: no urinary frequency, urgency, dysuria, or hematuria reported  Integumentary: no rash or pruritus reported  Musculoskeletal: no back pain, muscle pain, joint pain, or joint swelling reported  Neurologic: vertigo with certain head movements  Hematologic: no easy bruising or bleeding reported  Endocrine: no heat or cold intolerance reported; no polyuria or polydipsia reported  Psychiatric: no anxiety or depression reported    Past Medical History:   Diagnosis Date    Burning mouth syndrome     Multiple environmental allergies      Past Surgical History:   Procedure Laterality Date    ANTERIOR CERVICAL DISCECTOMY W/ FUSION  2022    C5-6    ESSURE TUBAL LIGATION  2010    LAPAROSCOPIC CYSTECTOMY OVARIAN (BENIGN)  05/12/2013    Procedure: LAPAROSCOPIC CYSTECTOMY OVARIAN (BENIGN);  Diagnostic laparoscopy, left ovarian cystectomy ;  Surgeon: Ignacia Madrid DO;  Location:  OR    LAPAROSCOPY DIAGNOSTIC (GYN)  05/12/2013    Procedure: LAPAROSCOPY DIAGNOSTIC (GYN);;  Surgeon: Ignacia Madrid DO;  Location:  OR     Family History   Problem Relation Age of Onset    Depression Mother     Hypertension Mother     Diabetes Type 2  Mother     Post-Traumatic Stress Disorder (PTSD) Father     Colon Cancer Maternal Grandmother     Myocardial Infarction Maternal Grandfather     Breast Cancer Paternal Grandmother     Diabetes Type 2  Paternal Grandmother     Diabetes Type 2  Paternal Grandfather     Myocardial Infarction Paternal Grandfather     Cerebrovascular Disease No family hx of     Coronary Artery Disease Early Onset No family hx of     Ovarian Cancer No family hx of      Social History     Occupational History    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    2 adult children.    4 grandchildren.    Very active jobs; no formal exercise.     Allergies   Allergen Reactions    Doxycycline Nausea    Penicillins Headache and Nausea and Vomiting     Current Outpatient Medications   Medication Sig    amitriptyline (ELAVIL) 10 MG tablet Take 1 tablet (10 mg) by mouth at bedtime    omeprazole (PRILOSEC) 40 MG DR capsule Take 1 capsule (40 mg) by mouth daily     Immunization History   Administered Date(s) Administered    COVID-19 MONOVALENT 12+ (Pfizer) 12/21/2020, 01/06/2021    COVID-19 Monovalent 12+ (Pfizer 2022) 01/13/2022    Flu, Unspecified 10/01/2019, 10/02/2020, 10/20/2021    HepB, Unspecified 06/29/2001, 08/08/2001    Influenza (H1N1) 11/14/2009    Influenza (IIV3) PF 11/21/2006, 11/12/2008, 11/03/2010, 09/14/2011    Influenza Vaccine >6 months,quad, PF 11/09/2019    Influenza Vaccine, 6+MO IM (QUADRIVALENT W/PRESERVATIVES) 10/01/2019    Mantoux Tuberculin Skin Test 09/21/2016    TD,PF 7+ (Tenivac) 06/29/2006     PREVENTATIVE HEALTH                                                      BMI: overweight  Blood pressure: within normal limits   Breast CA screening: DUE  Cervical CA screening: up to date   Colon CA screening: DUE  Lung CA screening: patient does not meet screening criteria  Dexa: not medically indicated at this time   Screening cholesterol: DUE  Screening diabetes: DUE  STD testing: no risk factors present  Alcohol misuse screening: alcohol use reviewed - no intervention indicated at this time  Immunizations: reviewed; up to date     OBJECTIVE      "                                                 /82   Pulse 89   Temp 98.5  F (36.9  C) (Temporal)   Ht 1.702 m (5' 7\")   Wt 85.8 kg (189 lb 3.2 oz)   SpO2 99%   BMI 29.63 kg/m    Constitutional: well-appearing  Head, Ears, and Eyes: normocephalic; normal external auditory canal and pinna; tympanic membranes visualized and normal; normal lids and conjunctivae  Neck: supple, symmetric, no thyromegaly or lymphadenopathy  Respiratory: normal respiratory effort; clear to auscultation bilaterally  Cardiovascular: regular rate and rhythm; no edema  Gastrointestinal: soft, non-tender, and non-distended; no organomegaly or masses  Musculoskeletal: normal gait and station  Psych: normal judgment and insight; normal mood and affect; recent and remote memory intact    ---  (Note was completed, in part, with [employer] voice-recognition software. Documentation was reviewed, but some grammatical, spelling, and word errors may remain.)    "

## 2024-07-11 ENCOUNTER — PATIENT OUTREACH (OUTPATIENT)
Dept: CARE COORDINATION | Facility: CLINIC | Age: 49
End: 2024-07-11
Payer: COMMERCIAL

## 2024-08-08 ENCOUNTER — PATIENT OUTREACH (OUTPATIENT)
Dept: CARE COORDINATION | Facility: CLINIC | Age: 49
End: 2024-08-08
Payer: COMMERCIAL

## 2024-09-11 ENCOUNTER — MYC MEDICAL ADVICE (OUTPATIENT)
Dept: INTERNAL MEDICINE | Facility: CLINIC | Age: 49
End: 2024-09-11
Payer: COMMERCIAL

## 2024-09-11 ENCOUNTER — MYC REFILL (OUTPATIENT)
Dept: INTERNAL MEDICINE | Facility: CLINIC | Age: 49
End: 2024-09-11
Payer: COMMERCIAL

## 2024-09-11 DIAGNOSIS — K14.6 BURNING MOUTH SYNDROME: ICD-10-CM

## 2024-09-11 DIAGNOSIS — J31.0 CHRONIC RHINITIS: ICD-10-CM

## 2024-09-11 DIAGNOSIS — K21.9 GASTROESOPHAGEAL REFLUX DISEASE WITHOUT ESOPHAGITIS: ICD-10-CM

## 2024-09-11 RX ORDER — AMITRIPTYLINE HYDROCHLORIDE 10 MG/1
10 TABLET ORAL AT BEDTIME
Qty: 90 TABLET | Refills: 3 | OUTPATIENT
Start: 2024-09-11

## 2024-09-11 RX ORDER — CETIRIZINE HYDROCHLORIDE 10 MG/1
10 TABLET ORAL EVERY EVENING
Qty: 90 TABLET | Refills: 3 | Status: SHIPPED | OUTPATIENT
Start: 2024-09-11

## 2024-09-11 RX ORDER — OMEPRAZOLE 40 MG/1
40 CAPSULE, DELAYED RELEASE ORAL DAILY
Qty: 90 CAPSULE | Refills: 3 | OUTPATIENT
Start: 2024-09-11

## 2024-09-11 NOTE — TELEPHONE ENCOUNTER
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     Med pended for your review. Please advise if visit is needed.     Carol Alfaro RN

## 2024-10-19 ENCOUNTER — HEALTH MAINTENANCE LETTER (OUTPATIENT)
Age: 49
End: 2024-10-19

## 2024-11-22 ENCOUNTER — TRANSFERRED RECORDS (OUTPATIENT)
Dept: HEALTH INFORMATION MANAGEMENT | Facility: CLINIC | Age: 49
End: 2024-11-22
Payer: COMMERCIAL

## 2024-12-16 ENCOUNTER — TELEPHONE (OUTPATIENT)
Dept: INTERNAL MEDICINE | Facility: CLINIC | Age: 49
End: 2024-12-16
Payer: COMMERCIAL

## 2024-12-16 NOTE — TELEPHONE ENCOUNTER
Spoke with pt, they understand that pap is due in 2026. Pt is worried about going through menopause, pt states bleeding for months, finally stopped, but is having abdominal pain. Told pt to call nurse line and that writer would inform Dr. Brambila.     Andreia Bear VF

## 2024-12-16 NOTE — TELEPHONE ENCOUNTER
FYI - Status Update    Who is Calling: patient    Update: pt. Would like to get a PAP Smear done    Does caller want a call/response back: Yes     Could we send this information to you in [employer] or would you prefer to receive a phone call?:   Patient would prefer a phone call   Okay to leave a detailed message?: Yes at Cell number on file:    Telephone Information:   Mobile [phone]

## 2024-12-17 NOTE — TELEPHONE ENCOUNTER
Recommend OV for further evaluation.     May use any virtual or virtual release spot for an in-person visit.     Patient may also be seen at noon (arrival time 11:40am) Mondays, Wednesdays, Thursdays, and Fridays OR at 1:00pm (arrival time 12:40pm) on Thursdays (during the huddle).    Please do not schedule in a same day, next day, or hospital follow-up slot.    Okay to double book lunch slot (but patient should still arrive by 11:40am).

## 2025-01-08 ENCOUNTER — PATIENT OUTREACH (OUTPATIENT)
Dept: CARE COORDINATION | Facility: CLINIC | Age: 50
End: 2025-01-08
Payer: COMMERCIAL

## 2025-01-23 ENCOUNTER — PATIENT OUTREACH (OUTPATIENT)
Dept: CARE COORDINATION | Facility: CLINIC | Age: 50
End: 2025-01-23
Payer: COMMERCIAL

## 2025-02-06 ENCOUNTER — PATIENT OUTREACH (OUTPATIENT)
Dept: CARE COORDINATION | Facility: CLINIC | Age: 50
End: 2025-02-06
Payer: COMMERCIAL

## 2025-02-12 ENCOUNTER — MYC REFILL (OUTPATIENT)
Dept: INTERNAL MEDICINE | Facility: CLINIC | Age: 50
End: 2025-02-12
Payer: COMMERCIAL

## 2025-02-12 DIAGNOSIS — J31.0 CHRONIC RHINITIS: ICD-10-CM

## 2025-02-12 RX ORDER — CETIRIZINE HYDROCHLORIDE 10 MG/1
10 TABLET ORAL EVERY EVENING
Qty: 90 TABLET | Refills: 3 | OUTPATIENT
Start: 2025-02-12

## 2025-02-19 ENCOUNTER — ANCILLARY PROCEDURE (OUTPATIENT)
Dept: MAMMOGRAPHY | Facility: CLINIC | Age: 50
End: 2025-02-19
Attending: INTERNAL MEDICINE
Payer: COMMERCIAL

## 2025-02-19 DIAGNOSIS — Z12.31 VISIT FOR SCREENING MAMMOGRAM: ICD-10-CM

## 2025-02-19 PROCEDURE — 77063 BREAST TOMOSYNTHESIS BI: CPT | Mod: TC | Performed by: RADIOLOGY

## 2025-02-19 PROCEDURE — 77067 SCR MAMMO BI INCL CAD: CPT | Mod: TC | Performed by: RADIOLOGY

## 2025-02-21 ENCOUNTER — TRANSFERRED RECORDS (OUTPATIENT)
Dept: HEALTH INFORMATION MANAGEMENT | Facility: CLINIC | Age: 50
End: 2025-02-21
Payer: COMMERCIAL

## 2025-03-10 DIAGNOSIS — K21.9 GASTROESOPHAGEAL REFLUX DISEASE WITHOUT ESOPHAGITIS: ICD-10-CM

## 2025-03-10 NOTE — LETTER
[facility]  [address] [place] [address]  [phone]  March 13, 2025  May Arrieta  [address] [facility] [address]    Dear May,    I am contacting you regarding the refill request we received for you. After reviewing your chart it looks like you are overdue for your annual and for a med check. Please call [phone] to  schedule this to continue to receive refills. If you anticipate running out before your appointment let us know and we can send in a jose refill.       Let them know there is a note in your chart with times to be worked in to be seen.         Thank you,     MAYLIN [facility] nursing staff

## 2025-03-11 RX ORDER — OMEPRAZOLE 40 MG/1
40 CAPSULE, DELAYED RELEASE ORAL DAILY
Qty: 90 CAPSULE | Refills: 3 | OUTPATIENT
Start: 2025-03-11

## 2025-03-22 ENCOUNTER — HEALTH MAINTENANCE LETTER (OUTPATIENT)
Age: 50
End: 2025-03-22

## 2025-04-01 ENCOUNTER — TRANSFERRED RECORDS (OUTPATIENT)
Dept: HEALTH INFORMATION MANAGEMENT | Facility: CLINIC | Age: 50
End: 2025-04-01
Payer: COMMERCIAL

## 2025-04-13 ENCOUNTER — OFFICE VISIT (OUTPATIENT)
Dept: URGENT CARE | Facility: URGENT CARE | Age: 50
End: 2025-04-13
Payer: COMMERCIAL

## 2025-04-13 VITALS
HEART RATE: 81 BPM | TEMPERATURE: 98.2 F | DIASTOLIC BLOOD PRESSURE: 77 MMHG | SYSTOLIC BLOOD PRESSURE: 134 MMHG | OXYGEN SATURATION: 100 % | BODY MASS INDEX: 31.67 KG/M2 | RESPIRATION RATE: 20 BRPM | WEIGHT: 202.2 LBS

## 2025-04-13 DIAGNOSIS — R05.8 PRODUCTIVE COUGH: ICD-10-CM

## 2025-04-13 DIAGNOSIS — J01.90 ACUTE SINUSITIS WITH COEXISTING CONDITION REQUIRING PROPHYLACTIC TREATMENT: Primary | ICD-10-CM

## 2025-04-13 PROCEDURE — 3075F SYST BP GE 130 - 139MM HG: CPT | Performed by: FAMILY MEDICINE

## 2025-04-13 PROCEDURE — 99213 OFFICE O/P EST LOW 20 MIN: CPT | Performed by: FAMILY MEDICINE

## 2025-04-13 PROCEDURE — 3078F DIAST BP <80 MM HG: CPT | Performed by: FAMILY MEDICINE

## 2025-04-13 RX ORDER — AZITHROMYCIN 250 MG/1
TABLET, FILM COATED ORAL
Qty: 6 TABLET | Refills: 0 | Status: SHIPPED | OUTPATIENT
Start: 2025-04-13 | End: 2025-04-16

## 2025-04-13 RX ORDER — AZITHROMYCIN 250 MG/1
TABLET, FILM COATED ORAL
Qty: 6 TABLET | Refills: 0 | Status: SHIPPED | OUTPATIENT
Start: 2025-04-13 | End: 2025-04-18

## 2025-04-13 NOTE — PROGRESS NOTES
Urgent Care Clinic Visit    Chief Complaint   Patient presents with    Cough     8 days of cold symptoms, cough and congestion. Sinus congestion               4/13/2025     9:12 AM   Additional Questions   Roomed by yennifer caldwell   Accompanied by self

## 2025-04-13 NOTE — PROGRESS NOTES
SUBJECTIVE: May Arrieta is a 49 year old female here with concerns about sinus infection.  She states onset  of symptoms were greater than 10 days with sinus pressure and drainage.  Course of illness is worsening.    Severity: moderate  Current and Associated symptoms: cold symptoms  Predisposing factors include none.   Recent treatment has included: OTC's    Past Medical History:   Diagnosis Date    Burning mouth syndrome     Multiple environmental allergies      Allergies   Allergen Reactions    Doxycycline Nausea    Penicillins Headache and Nausea and Vomiting     Social History     Tobacco Use    Smoking status: Former     Current packs/day: 0.00     Average packs/day: 1 pack/day for 33.0 years (33.0 ttl pk-yrs)     Types: Cigarettes     Quit date: 2024     Years since quittin.6    Smokeless tobacco: Never    Tobacco comments:     3 cigs/day (as of ); 33 years (as of ); 1ppd at her most   Substance Use Topics    Alcohol use: Yes     Comment: rare       ROS:   no rash  no vomiting    OBJECTIVE:  /77   Pulse 81   Temp 98.2  F (36.8  C) (Oral)   Resp 20   Wt 91.7 kg (202 lb 3.2 oz)   SpO2 100%   BMI 31.67 kg/m    NAD  EYES: clear, no mattering  EARS: TM's clear, no redness/buldging, canals clear, no swelling/redness  NOSE: discolored discharge chi. Nares  THROAT: clear, no erythema, no exudate  SINUS: maxillary tenderness with palpation  LUNGS: CTAB, no rhonchi/wheeze/rales      ICD-10-CM    1. Acute sinusitis with coexisting condition requiring prophylactic treatment  J01.90 azithromycin (ZITHROMAX) 250 MG tablet     azithromycin (ZITHROMAX) 250 MG tablet      2. Productive cough  R05.8 azithromycin (ZITHROMAX) 250 MG tablet        Fill 2nd z pack if after 10 days sinus symptoms cont  Follow up with primary clinic if not improving

## 2025-04-17 ENCOUNTER — OFFICE VISIT (OUTPATIENT)
Dept: INTERNAL MEDICINE | Facility: CLINIC | Age: 50
End: 2025-04-17
Payer: COMMERCIAL

## 2025-04-17 VITALS
OXYGEN SATURATION: 99 % | TEMPERATURE: 98.8 F | DIASTOLIC BLOOD PRESSURE: 82 MMHG | BODY MASS INDEX: 29.93 KG/M2 | RESPIRATION RATE: 18 BRPM | SYSTOLIC BLOOD PRESSURE: 126 MMHG | HEIGHT: 68 IN | HEART RATE: 95 BPM | WEIGHT: 197.5 LBS

## 2025-04-17 DIAGNOSIS — Z12.11 SPECIAL SCREENING FOR MALIGNANT NEOPLASMS, COLON: ICD-10-CM

## 2025-04-17 DIAGNOSIS — Z13.220 SCREENING FOR CHOLESTEROL LEVEL: ICD-10-CM

## 2025-04-17 DIAGNOSIS — Z00.00 ROUTINE HISTORY AND PHYSICAL EXAMINATION OF ADULT: Primary | ICD-10-CM

## 2025-04-17 DIAGNOSIS — Z91.09 MULTIPLE ENVIRONMENTAL ALLERGIES: ICD-10-CM

## 2025-04-17 DIAGNOSIS — Z13.1 SCREENING FOR DIABETES MELLITUS: ICD-10-CM

## 2025-04-17 DIAGNOSIS — K14.6 BURNING MOUTH SYNDROME: ICD-10-CM

## 2025-04-17 DIAGNOSIS — K21.9 GASTROESOPHAGEAL REFLUX DISEASE WITHOUT ESOPHAGITIS: ICD-10-CM

## 2025-04-17 DIAGNOSIS — R73.01 IMPAIRED FASTING GLUCOSE: ICD-10-CM

## 2025-04-17 LAB
ALBUMIN SERPL BCG-MCNC: 4.3 G/DL (ref 3.5–5.2)
ALP SERPL-CCNC: 73 U/L (ref 40–150)
ALT SERPL W P-5'-P-CCNC: 62 U/L (ref 0–50)
ANION GAP SERPL CALCULATED.3IONS-SCNC: 12 MMOL/L (ref 7–15)
AST SERPL W P-5'-P-CCNC: 48 U/L (ref 0–45)
BILIRUB SERPL-MCNC: 0.7 MG/DL
BUN SERPL-MCNC: 14.4 MG/DL (ref 6–20)
CALCIUM SERPL-MCNC: 9.5 MG/DL (ref 8.8–10.4)
CHLORIDE SERPL-SCNC: 105 MMOL/L (ref 98–107)
CHOLEST SERPL-MCNC: 190 MG/DL
CREAT SERPL-MCNC: 0.83 MG/DL (ref 0.51–0.95)
EGFRCR SERPLBLD CKD-EPI 2021: 86 ML/MIN/1.73M2
EST. AVERAGE GLUCOSE BLD GHB EST-MCNC: 117 MG/DL
FASTING STATUS PATIENT QL REPORTED: NO
FASTING STATUS PATIENT QL REPORTED: NO
GLUCOSE SERPL-MCNC: 104 MG/DL (ref 70–99)
HBA1C MFR BLD: 5.7 % (ref 0–5.6)
HCO3 SERPL-SCNC: 21 MMOL/L (ref 22–29)
HDLC SERPL-MCNC: 41 MG/DL
LDLC SERPL CALC-MCNC: 126 MG/DL
NONHDLC SERPL-MCNC: 149 MG/DL
POTASSIUM SERPL-SCNC: 4.4 MMOL/L (ref 3.4–5.3)
PROT SERPL-MCNC: 7.5 G/DL (ref 6.4–8.3)
SODIUM SERPL-SCNC: 138 MMOL/L (ref 135–145)
TRIGL SERPL-MCNC: 113 MG/DL

## 2025-04-17 RX ORDER — OMEPRAZOLE 40 MG/1
40 CAPSULE, DELAYED RELEASE ORAL DAILY
Qty: 90 CAPSULE | Refills: 3 | Status: SHIPPED | OUTPATIENT
Start: 2025-04-17

## 2025-04-17 RX ORDER — AMITRIPTYLINE HYDROCHLORIDE 10 MG/1
10 TABLET ORAL AT BEDTIME
Qty: 90 TABLET | Refills: 3 | Status: SHIPPED | OUTPATIENT
Start: 2025-04-17

## 2025-04-17 RX ORDER — CETIRIZINE HYDROCHLORIDE 10 MG/1
10 TABLET ORAL EVERY EVENING
Qty: 90 TABLET | Refills: 3 | Status: SHIPPED | OUTPATIENT
Start: 2025-04-17

## 2025-04-17 RX ORDER — FLUTICASONE PROPIONATE 50 MCG
2 SPRAY, SUSPENSION (ML) NASAL DAILY
Qty: 16 G | Refills: 3 | Status: SHIPPED | OUTPATIENT
Start: 2025-04-17

## 2025-04-17 SDOH — HEALTH STABILITY: PHYSICAL HEALTH: ON AVERAGE, HOW MANY DAYS PER WEEK DO YOU ENGAGE IN MODERATE TO STRENUOUS EXERCISE (LIKE A BRISK WALK)?: 3 DAYS

## 2025-04-17 SDOH — HEALTH STABILITY: PHYSICAL HEALTH: ON AVERAGE, HOW MANY MINUTES DO YOU ENGAGE IN EXERCISE AT THIS LEVEL?: 30 MIN

## 2025-04-17 ASSESSMENT — SOCIAL DETERMINANTS OF HEALTH (SDOH): HOW OFTEN DO YOU GET TOGETHER WITH FRIENDS OR RELATIVES?: ONCE A WEEK

## 2025-04-17 NOTE — PROGRESS NOTES
ASSESSMENT/PLAN                                                       (Z00.00) Routine history and physical examination of adult  (primary encounter diagnosis)  Comment: PMH, PSH, FH, SH, medications, allergies, immunizations, and preventative health measures reviewed and updated as appropriate.  Plan: see below for plans.      (Z91.09) Multiple environmental allergies  Comment: well-controlled on Zyrtec and Flonase.  Plan: continue present management; refills provided.     (K14.6) Burning mouth syndrome  Comment: well-controlled on amitriptyline.   Plan: continue present management; refills provided.     (Z12.11) Special screening for malignant neoplasms, colon  Plan: screening colonoscopy ordered - patient to schedule.     (R73.01) Impaired fasting glucose  (Z13.220) Screening for cholesterol level  (Z13.1) Screening for diabetes mellitus  Plan: non-fasting labs today; recommendations to follow.     (K21.9) Gastroesophageal reflux disease without esophagitis  Comment: well-controlled on omeprazole.   Plan: continue present management; refills provided.     Lashell Brambila MD   [address] [address]  T: [phone], F: [phone]    SUBJECTIVE                                                      May Arrieta is a very pleasant 49 year old female who presents for a physical.    ROS:  Constitutional: no unintentional weight loss or gain reported; no fevers, chills, or sweats  reported    Cardiovascular: no chest pain, palpitations, or edema reported  Respiratory: no cough, wheezing, shortness of breath, or dyspnea on exertion reported  Gastrointestinal: no nausea, vomiting, constipation, diarrhea, or abdominal pain reported  Genitourinary: no urinary frequency, urgency, dysuria, or hematuria reported  Integumentary: no rash or pruritus reported  Musculoskeletal: no back pain, muscle pain, joint pain, or joint swelling reported  Neurologic: no focal weakness,  numbness, or tingling reported  Hematologic: no easy bruising or bleeding reported  Endocrine: no heat or cold intolerance reported; no polyuria or polydipsia reported  Psychiatric: no anxiety or depression reported    Past Medical History:   Diagnosis Date    Acid reflux disease     Burning mouth syndrome     Multiple environmental allergies     Obesity (BMI 30-39.9)      Past Surgical History:   Procedure Laterality Date    ANTERIOR CERVICAL DISCECTOMY W/ FUSION  2022    C5-6    ESSURE TUBAL LIGATION  2010    LAPAROSCOPIC CYSTECTOMY OVARIAN (BENIGN)  05/12/2013    Procedure: LAPAROSCOPIC CYSTECTOMY OVARIAN (BENIGN);  Diagnostic laparoscopy, left ovarian cystectomy ;  Surgeon: Ignacia Madrid DO;  Location:  OR    LAPAROSCOPY DIAGNOSTIC (GYN)  05/12/2013    Procedure: LAPAROSCOPY DIAGNOSTIC (GYN);;  Surgeon: Ignacia Madrid DO;  Location:  OR     Family History   Problem Relation Age of Onset    Depression Mother     Hypertension Mother     Diabetes Type 2  Mother     Post-Traumatic Stress Disorder (PTSD) Father     Colon Cancer Maternal Grandmother     Myocardial Infarction Maternal Grandfather     Breast Cancer Paternal Grandmother     Diabetes Type 2  Paternal Grandmother     Diabetes Type 2  Paternal Grandfather     Myocardial Infarction Paternal Grandfather     Cerebrovascular Disease No family hx of     Coronary Artery Disease Early Onset No family hx of     Ovarian Cancer No family hx of      Social History     Occupational History    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    2 children.    4 grandchildren.    Very active jobs; no formal exercise.       Allergies   Allergen Reactions    Doxycycline Nausea    Penicillins Headache and Nausea and Vomiting     Current Outpatient Medications   Medication Sig    amitriptyline (ELAVIL) 10 MG tablet Take 1 tablet (10 mg) by mouth at bedtime.    azithromycin (ZITHROMAX) 250 MG tablet Take 2 tablets (500 mg) by mouth daily for 1 day, THEN 1 tablet (250 mg) daily for 4 days.    cetirizine (ZYRTEC) 10 MG tablet Take 1 tablet (10 mg) by mouth every evening.    fluticasone (FLONASE) 50 MCG/ACT nasal spray Spray 2 sprays into both nostrils daily.    omeprazole (PRILOSEC) 40 MG DR capsule Take 1 capsule (40 mg) by mouth daily.     Immunization History   Administered Date(s) Administered    COVID-19 MONOVALENT 12+ (Pfizer) 12/21/2020, 01/06/2021    COVID-19 Monovalent 12+ (Pfizer 2022) 01/13/2022    Flu, Unspecified 10/01/2019, 10/02/2020, 10/20/2021, 12/03/2024    HepB, Unspecified 06/29/2001, 08/08/2001    Influenza (H1N1) 11/14/2009    Influenza (IIV3) PF 11/21/2006, 11/12/2008, 11/03/2010, 09/14/2011    Influenza Vaccine >6 months,quad, PF 11/09/2019    Influenza Vaccine, 6+MO IM (QUADRIVALENT W/PRESERVATIVES) 10/01/2019    Mantoux Tuberculin Skin Test 09/21/2016    TD,PF 7+ (Tenivac) 06/29/2006     PREVENTATIVE HEALTH                                                      BMI: obese  Blood pressure: within normal limits   Breast CA screening: up to date   Cervical CA screening: up to date   Colon CA screening: DUE  Lung CA screening: patient does not meet screening criteria  Dexa: not medically indicated at this time   Screening cholesterol: DUE  Screening diabetes: DUE  STD testing: no risk factors present  Alcohol misuse screening: alcohol use reviewed - no intervention indicated at this time  Immunizations: reviewed;  COVID-19 booster, flu shot, and tetanus booster DUE - previously declined (not discussed at today's visit)    OBJECTIVE   "                                                    /82   Pulse 95   Temp 98.8  F (37.1  C) (Temporal)   Resp 18   Ht 1.715 m (5' 7.5\")   Wt 89.6 kg (197 lb 8 oz)   SpO2 99%   BMI 30.48 kg/m    Constitutional: well-appearing  Head, Ears, and Eyes: normocephalic; normal external auditory canal and pinna; tympanic membranes visualized and normal; normal lids and conjunctivae  Neck: supple, symmetric, no thyromegaly or lymphadenopathy  Respiratory: normal respiratory effort; clear to auscultation bilaterally  Cardiovascular: regular rate and rhythm; no edema  Gastrointestinal: soft, non-tender, and non-distended; no organomegaly or masses  Musculoskeletal: normal gait and station  Psych: normal judgment and insight; normal mood and affect; recent and remote memory intact    ---  (Note was completed, in part, with [employer] voice-recognition software. Documentation was reviewed, but some grammatical, spelling, and word errors may remain.)    "

## 2025-05-01 NOTE — PROGRESS NOTES
May is a 49 year old  female who presents for annual exam.     Besides routine health maintenance,  she would like to discuss bleeding for 3 months. Back to monthly now but having large clots. Dizzy, nauseated. Discharge. 2 days to 3 months      HPI:  The patient's PCP is Dr. Lashell Brambila MD.    New patient to me here today for her annual GYN exam.  She is due for Pap smear today.  She has never had a colonoscopy but a referral has been sent.  She had a negative mammogram in February of this year.    Her main concern today is abnormal uterine bleeding.  Back in January she had had a menstrual cycle that lasted over 1 month.  She was fatigued and dizzy because of the bleeding.  She states that she had quite large clots and would experience bleeding with intercourse.  She had no cramping with the prolonged bleed.  She does have Essure devices in place.  Her last ultrasound was in  and it was unremarkable.    She is a former smoker and quit last year.  She does vape on occasion.      GYNECOLOGIC HISTORY:    No LMP recorded. (Menstrual status: Irregular Periods).    Her current contraception method is: essure .  She  reports that she has quit smoking. Her smoking use included cigarettes. She has never used smokeless tobacco.  Tobacco Cessation Action Plan: Information offered: Patient not interested at this time  Patient is sexually active.  STD testing offered?  Declined  Last PHQ-9 score on record =       6/3/2021     9:03 AM   PHQ-9 SCORE   PHQ-9 Total Score 5     Last GAD7 score on record =       6/3/2021     9:03 AM   NADYA-7 SCORE   Total Score 5     Alcohol Score =     HEALTH MAINTENANCE:  Cholesterol:   Recent Labs   Lab Test 25  1019 24  1116   CHOL 190 186   HDL 41* 48*   * 119*   TRIG 113 95     Last Mammo:  2025 , Result: Normal, Next Mammo: 2026  Pap:   Lab Results   Component Value Date    PAP NIL 2021    PAP NIL 2016    PAP NIL 2011      Colonoscopy:  NA, Result: Not applicable, Next Colonoscopy: 45 years.  Dexa:  NA    Health maintenance updated:  yes    HISTORY:  OB History    Para Term  AB Living   2 2 2 0 0 2   SAB IAB Ectopic Multiple Live Births   0 0 0 0 2      # Outcome Date GA Lbr Shaun/2nd Weight Sex Type Anes PTL Lv   2 Term 05 39w0d  3.062 kg (6 lb 12 oz) F VACUUM   DILLON      Name: Ami      Apgar1: 8  Apgar5: 9   1 Term 94 40w0d 05:00 3.487 kg (7 lb 11 oz) M    DILLON      Name: FLORIN       Patient Active Problem List   Diagnosis    Burning mouth syndrome    Multiple environmental allergies    Obesity (BMI 30-39.9)    Acid reflux disease    Impaired fasting glucose    Pure hypercholesterolemia     Past Surgical History:   Procedure Laterality Date    ANTERIOR CERVICAL DISCECTOMY W/ FUSION      C5-6    ESSURE TUBAL LIGATION      LAPAROSCOPIC CYSTECTOMY OVARIAN (BENIGN)  2013    Procedure: LAPAROSCOPIC CYSTECTOMY OVARIAN (BENIGN);  Diagnostic laparoscopy, left ovarian cystectomy ;  Surgeon: Ignacia Madrid DO;  Location:  OR    LAPAROSCOPY DIAGNOSTIC (GYN)  2013    Procedure: LAPAROSCOPY DIAGNOSTIC (GYN);;  Surgeon: Ignacia Madrid DO;  Location:  OR      Social History     Tobacco Use    Smoking status: Former     Types: Cigarettes    Smokeless tobacco: Never    Tobacco comments:     Quit in ; smoked for 33 years; 1ppd at her most   Substance Use Topics    Alcohol use: Yes     Comment: rare      Problem (# of Occurrences) Relation (Name,Age of Onset)    Post-Traumatic Stress Disorder (PTSD) (1) Father    Depression (1) Mother    Hypertension (1) Mother    Breast Cancer (1) Paternal Grandmother    Myocardial Infarction (2) Maternal Grandfather, Paternal Grandfather    Colon Cancer (1) Maternal Grandmother    Diabetes Type 2  (3) Mother, Paternal Grandmother, Paternal Grandfather           Negative family history of: Cerebrovascular Disease, Coronary Artery Disease  "Early Onset, Ovarian Cancer              Current Outpatient Medications   Medication Sig Dispense Refill    amitriptyline (ELAVIL) 10 MG tablet Take 1 tablet (10 mg) by mouth at bedtime. 90 tablet 3    cetirizine (ZYRTEC) 10 MG tablet Take 1 tablet (10 mg) by mouth every evening. 90 tablet 3    fluticasone (FLONASE) 50 MCG/ACT nasal spray Spray 2 sprays into both nostrils daily. 16 g 3    omeprazole (PRILOSEC) 40 MG DR capsule Take 1 capsule (40 mg) by mouth daily. 90 capsule 3     No current facility-administered medications for this visit.     Allergies   Allergen Reactions    Doxycycline Nausea    Penicillins Headache and Nausea and Vomiting       Past medical, surgical, social and family histories were reviewed and updated in EPIC.    EXAM:  /80   Ht 1.715 m (5' 7.5\")   Wt 89.3 kg (196 lb 12.8 oz)   Breastfeeding No   BMI 30.37 kg/m     BMI: Body mass index is 30.37 kg/m .    PHYSICAL EXAM:  Constitutional:   Appearance: Well nourished, well developed, alert, in no acute distress  Breasts: Inspection of Breasts:  No lymphadenopathy present., Palpation of Breasts and Axillae:  No masses present on palpation, no breast tenderness., Axillary Lymph Nodes:  No lymphadenopathy present., and No nodularity, asymmetry or nipple discharge bilaterally.  Lymphatic: Lymph Nodes:  No other lymphadenopathy present  Skin:  General Inspection:  No rashes present, no lesions present, no areas of  discoloration  Neurologic:    Mental Status:  Oriented X3.  Normal strength and tone, sensory exam                grossly normal, mentation intact and speech normal.    Psychiatric:   Mentation appears normal and affect normal/bright.         Pelvic Exam:  External Genitalia:     Normal appearance for age, no discharge present, no tenderness present, no inflammatory lesions present, color normal  Vagina:     Quarter size mobile soft cystic structure of the posterior vaginal canal, small amount of creamy white discharge present, " no inflammatory lesions present, no masses present  Bladder:     Nontender to palpation  Urethra:   Urethral Body:  Urethra palpation normal, urethra structural support normal   Urethral Meatus:  No erythema or lesions present  Cervix:     Appearance erythematous, no lesions present, nontender to palpation, no bleeding present.  Friable with Pap collection today.    Uterus:     Uterus: firm, normal sized and nontender, anteverted in position.   Adnexa:     No adnexal tenderness present, no adnexal masses present  Perineum:     Perineum within normal limits, no evidence of trauma, no rashes or skin lesions present  Anus:     Anus within normal limits, no hemorrhoids present  Inguinal Lymph Nodes:     No lymphadenopathy present  Pubic Hair:     Normal pubic hair distribution for age  Genitalia and Groin:     No rashes present, no lesions present, no areas of discoloration, no masses present    COUNSELING:   Special attention given to:        Regular exercise       Healthy diet/nutrition       Colorectal Cancer Screening       (Krista)menopause management    BMI: Body mass index is 30.37 kg/m .  Weight management plan: Discussed healthy diet and exercise guidelines    ASSESSMENT:  49 year old female with satisfactory annual exam.    ICD-10-CM    1. Encounter for gynecological examination without abnormal finding  Z01.419 HPV and Gynecologic Cytology Panel - Recommended Age 30-65 Years      2. Vaginal discharge  N89.8 Multiplex Vaginal Panel by PCR      3. DUB (dysfunctional uterine bleeding)  N93.8 US Transvaginal Pelvic Non-OB          PLAN:  49-year-old perimenopausal female with a paravaginal cyst as well as abnormal uterine bleeding.  We have asked her to complete a transvaginal ultrasound.  Vaginal culture will be sent and Pap smear was completed.  If her Pap smear is normal she can repeat in 3 years.  She is to continue with annual mammograms.  We stressed the importance of getting her colonoscopy  completed.    RADHA Zimmerman CNP

## 2025-05-06 ENCOUNTER — OFFICE VISIT (OUTPATIENT)
Dept: OBGYN | Facility: CLINIC | Age: 50
End: 2025-05-06
Payer: COMMERCIAL

## 2025-05-06 VITALS
WEIGHT: 196.8 LBS | HEIGHT: 68 IN | DIASTOLIC BLOOD PRESSURE: 80 MMHG | SYSTOLIC BLOOD PRESSURE: 110 MMHG | BODY MASS INDEX: 29.83 KG/M2

## 2025-05-06 DIAGNOSIS — N89.8 VAGINAL DISCHARGE: ICD-10-CM

## 2025-05-06 DIAGNOSIS — N93.8 DUB (DYSFUNCTIONAL UTERINE BLEEDING): ICD-10-CM

## 2025-05-06 DIAGNOSIS — Z01.419 ENCOUNTER FOR GYNECOLOGICAL EXAMINATION WITHOUT ABNORMAL FINDING: Primary | ICD-10-CM

## 2025-05-06 LAB
BACTERIAL VAGINOSIS VAG-IMP: POSITIVE
CANDIDA DNA VAG QL NAA+PROBE: NOT DETECTED
CANDIDA GLABRATA / CANDIDA KRUSEI DNA: NOT DETECTED
T VAGINALIS DNA VAG QL NAA+PROBE: NOT DETECTED

## 2025-05-06 PROCEDURE — 87624 HPV HI-RISK TYP POOLED RSLT: CPT | Performed by: NURSE PRACTITIONER

## 2025-05-06 PROCEDURE — 81515 NFCT DS BV&VAGINITIS DNA ALG: CPT | Performed by: NURSE PRACTITIONER

## 2025-05-06 PROCEDURE — 99459 PELVIC EXAMINATION: CPT | Performed by: NURSE PRACTITIONER

## 2025-05-06 PROCEDURE — 99213 OFFICE O/P EST LOW 20 MIN: CPT | Mod: 25 | Performed by: NURSE PRACTITIONER

## 2025-05-06 PROCEDURE — 3079F DIAST BP 80-89 MM HG: CPT | Performed by: NURSE PRACTITIONER

## 2025-05-06 PROCEDURE — 99386 PREV VISIT NEW AGE 40-64: CPT | Performed by: NURSE PRACTITIONER

## 2025-05-06 PROCEDURE — 3074F SYST BP LT 130 MM HG: CPT | Performed by: NURSE PRACTITIONER

## 2025-05-07 ENCOUNTER — RESULTS FOLLOW-UP (OUTPATIENT)
Dept: OBGYN | Facility: CLINIC | Age: 50
End: 2025-05-07

## 2025-05-07 DIAGNOSIS — N89.8 VAGINAL DISCHARGE: Primary | ICD-10-CM

## 2025-05-07 LAB
HPV HR 12 DNA CVX QL NAA+PROBE: NEGATIVE
HPV16 DNA CVX QL NAA+PROBE: NEGATIVE
HPV18 DNA CVX QL NAA+PROBE: NEGATIVE
HUMAN PAPILLOMA VIRUS FINAL DIAGNOSIS: NORMAL

## 2025-05-07 RX ORDER — METRONIDAZOLE 7.5 MG/G
1 GEL VAGINAL AT BEDTIME
Qty: 70 G | Refills: 0 | Status: SHIPPED | OUTPATIENT
Start: 2025-05-07 | End: 2025-05-12

## 2025-05-16 ENCOUNTER — ANCILLARY PROCEDURE (OUTPATIENT)
Dept: ULTRASOUND IMAGING | Facility: CLINIC | Age: 50
End: 2025-05-16
Attending: NURSE PRACTITIONER
Payer: COMMERCIAL

## 2025-05-16 DIAGNOSIS — N93.8 DUB (DYSFUNCTIONAL UTERINE BLEEDING): ICD-10-CM

## 2025-05-16 PROCEDURE — 76830 TRANSVAGINAL US NON-OB: CPT | Performed by: OBSTETRICS & GYNECOLOGY
